# Patient Record
Sex: MALE | Race: ASIAN | NOT HISPANIC OR LATINO | URBAN - METROPOLITAN AREA
[De-identification: names, ages, dates, MRNs, and addresses within clinical notes are randomized per-mention and may not be internally consistent; named-entity substitution may affect disease eponyms.]

---

## 2018-08-19 ENCOUNTER — EMERGENCY (EMERGENCY)
Facility: HOSPITAL | Age: 43
LOS: 1 days | Discharge: HOME | End: 2018-08-19
Attending: EMERGENCY MEDICINE | Admitting: EMERGENCY MEDICINE
Payer: SELF-PAY

## 2018-08-19 VITALS
HEART RATE: 125 BPM | TEMPERATURE: 99 F | OXYGEN SATURATION: 100 % | RESPIRATION RATE: 16 BRPM | DIASTOLIC BLOOD PRESSURE: 129 MMHG | SYSTOLIC BLOOD PRESSURE: 153 MMHG

## 2018-08-19 VITALS
OXYGEN SATURATION: 100 % | DIASTOLIC BLOOD PRESSURE: 95 MMHG | TEMPERATURE: 99 F | SYSTOLIC BLOOD PRESSURE: 148 MMHG | HEART RATE: 93 BPM | RESPIRATION RATE: 16 BRPM

## 2018-08-19 LAB
ALBUMIN SERPL ELPH-MCNC: 4.4 G/DL — SIGNIFICANT CHANGE UP (ref 3.3–5)
ALP SERPL-CCNC: 89 U/L — SIGNIFICANT CHANGE UP (ref 40–120)
ALT FLD-CCNC: 30 U/L — SIGNIFICANT CHANGE UP (ref 4–41)
AST SERPL-CCNC: 35 U/L — SIGNIFICANT CHANGE UP (ref 4–40)
BASE EXCESS BLDV CALC-SCNC: 11.4 MMOL/L — SIGNIFICANT CHANGE UP
BASOPHILS # BLD AUTO: 0.01 K/UL — SIGNIFICANT CHANGE UP (ref 0–0.2)
BASOPHILS NFR BLD AUTO: 0.1 % — SIGNIFICANT CHANGE UP (ref 0–2)
BILIRUB SERPL-MCNC: 1.2 MG/DL — SIGNIFICANT CHANGE UP (ref 0.2–1.2)
BLOOD GAS VENOUS - CREATININE: 0.62 MG/DL — SIGNIFICANT CHANGE UP (ref 0.5–1.3)
BUN SERPL-MCNC: 16 MG/DL — SIGNIFICANT CHANGE UP (ref 7–23)
CALCIUM SERPL-MCNC: 9.7 MG/DL — SIGNIFICANT CHANGE UP (ref 8.4–10.5)
CHLORIDE BLDV-SCNC: 93 MMOL/L — LOW (ref 96–108)
CHLORIDE SERPL-SCNC: 86 MMOL/L — LOW (ref 98–107)
CO2 SERPL-SCNC: 29 MMOL/L — SIGNIFICANT CHANGE UP (ref 22–31)
CREAT SERPL-MCNC: 0.75 MG/DL — SIGNIFICANT CHANGE UP (ref 0.5–1.3)
EOSINOPHIL # BLD AUTO: 0.01 K/UL — SIGNIFICANT CHANGE UP (ref 0–0.5)
EOSINOPHIL NFR BLD AUTO: 0.1 % — SIGNIFICANT CHANGE UP (ref 0–6)
GAS PNL BLDV: 131 MMOL/L — LOW (ref 136–146)
GLUCOSE BLDV-MCNC: 153 — HIGH (ref 70–99)
GLUCOSE SERPL-MCNC: 151 MG/DL — HIGH (ref 70–99)
HCO3 BLDV-SCNC: 34 MMOL/L — HIGH (ref 20–27)
HCT VFR BLD CALC: 41.8 % — SIGNIFICANT CHANGE UP (ref 39–50)
HCT VFR BLDV CALC: 45.1 % — SIGNIFICANT CHANGE UP (ref 39–51)
HGB BLD-MCNC: 14.1 G/DL — SIGNIFICANT CHANGE UP (ref 13–17)
HGB BLDV-MCNC: 14.7 G/DL — SIGNIFICANT CHANGE UP (ref 13–17)
IMM GRANULOCYTES # BLD AUTO: 0.05 # — SIGNIFICANT CHANGE UP
IMM GRANULOCYTES NFR BLD AUTO: 0.7 % — SIGNIFICANT CHANGE UP (ref 0–1.5)
LACTATE BLDV-MCNC: 2.9 MMOL/L — HIGH (ref 0.5–2)
LIDOCAIN IGE QN: 35.6 U/L — SIGNIFICANT CHANGE UP (ref 7–60)
LYMPHOCYTES # BLD AUTO: 0.95 K/UL — LOW (ref 1–3.3)
LYMPHOCYTES # BLD AUTO: 12.8 % — LOW (ref 13–44)
MCHC RBC-ENTMCNC: 26.8 PG — LOW (ref 27–34)
MCHC RBC-ENTMCNC: 33.7 % — SIGNIFICANT CHANGE UP (ref 32–36)
MCV RBC AUTO: 79.3 FL — LOW (ref 80–100)
MONOCYTES # BLD AUTO: 0.41 K/UL — SIGNIFICANT CHANGE UP (ref 0–0.9)
MONOCYTES NFR BLD AUTO: 5.5 % — SIGNIFICANT CHANGE UP (ref 2–14)
NEUTROPHILS # BLD AUTO: 6.02 K/UL — SIGNIFICANT CHANGE UP (ref 1.8–7.4)
NEUTROPHILS NFR BLD AUTO: 80.8 % — HIGH (ref 43–77)
NRBC # FLD: 0 — SIGNIFICANT CHANGE UP
PCO2 BLDV: 44 MMHG — SIGNIFICANT CHANGE UP (ref 41–51)
PH BLDV: 7.52 PH — HIGH (ref 7.32–7.43)
PLATELET # BLD AUTO: 183 K/UL — SIGNIFICANT CHANGE UP (ref 150–400)
PMV BLD: 10.7 FL — SIGNIFICANT CHANGE UP (ref 7–13)
PO2 BLDV: 29 MMHG — LOW (ref 35–40)
POTASSIUM BLDV-SCNC: 3 MMOL/L — LOW (ref 3.4–4.5)
POTASSIUM SERPL-MCNC: 3.1 MMOL/L — LOW (ref 3.5–5.3)
POTASSIUM SERPL-SCNC: 3.1 MMOL/L — LOW (ref 3.5–5.3)
PROT SERPL-MCNC: 8.1 G/DL — SIGNIFICANT CHANGE UP (ref 6–8.3)
RBC # BLD: 5.27 M/UL — SIGNIFICANT CHANGE UP (ref 4.2–5.8)
RBC # FLD: 13.6 % — SIGNIFICANT CHANGE UP (ref 10.3–14.5)
SAO2 % BLDV: 54.8 % — LOW (ref 60–85)
SODIUM SERPL-SCNC: 133 MMOL/L — LOW (ref 135–145)
TROPONIN T, HIGH SENSITIVITY: 6 NG/L — SIGNIFICANT CHANGE UP (ref ?–14)
TROPONIN T, HIGH SENSITIVITY: 8 NG/L — SIGNIFICANT CHANGE UP (ref ?–14)
WBC # BLD: 7.45 K/UL — SIGNIFICANT CHANGE UP (ref 3.8–10.5)
WBC # FLD AUTO: 7.45 K/UL — SIGNIFICANT CHANGE UP (ref 3.8–10.5)

## 2018-08-19 PROCEDURE — 99285 EMERGENCY DEPT VISIT HI MDM: CPT

## 2018-08-19 PROCEDURE — 71046 X-RAY EXAM CHEST 2 VIEWS: CPT | Mod: 26

## 2018-08-19 RX ORDER — POTASSIUM CHLORIDE 20 MEQ
40 PACKET (EA) ORAL ONCE
Qty: 0 | Refills: 0 | Status: COMPLETED | OUTPATIENT
Start: 2018-08-19 | End: 2018-08-19

## 2018-08-19 RX ORDER — MORPHINE SULFATE 50 MG/1
2 CAPSULE, EXTENDED RELEASE ORAL ONCE
Qty: 0 | Refills: 0 | Status: DISCONTINUED | OUTPATIENT
Start: 2018-08-19 | End: 2018-08-19

## 2018-08-19 RX ORDER — ONDANSETRON 8 MG/1
8 TABLET, FILM COATED ORAL ONCE
Qty: 0 | Refills: 0 | Status: COMPLETED | OUTPATIENT
Start: 2018-08-19 | End: 2018-08-19

## 2018-08-19 RX ORDER — METOCLOPRAMIDE HCL 10 MG
10 TABLET ORAL ONCE
Qty: 0 | Refills: 0 | Status: COMPLETED | OUTPATIENT
Start: 2018-08-19 | End: 2018-08-19

## 2018-08-19 RX ORDER — SODIUM CHLORIDE 9 MG/ML
1000 INJECTION, SOLUTION INTRAVENOUS ONCE
Qty: 0 | Refills: 0 | Status: COMPLETED | OUTPATIENT
Start: 2018-08-19 | End: 2018-08-19

## 2018-08-19 RX ADMIN — Medication 40 MILLIEQUIVALENT(S): at 14:37

## 2018-08-19 RX ADMIN — SODIUM CHLORIDE 1000 MILLILITER(S): 9 INJECTION, SOLUTION INTRAVENOUS at 11:55

## 2018-08-19 RX ADMIN — MORPHINE SULFATE 2 MILLIGRAM(S): 50 CAPSULE, EXTENDED RELEASE ORAL at 12:06

## 2018-08-19 RX ADMIN — ONDANSETRON 8 MILLIGRAM(S): 8 TABLET, FILM COATED ORAL at 11:55

## 2018-08-19 RX ADMIN — Medication 10 MILLIGRAM(S): at 16:01

## 2018-08-19 NOTE — ED PROVIDER NOTE - NEUROLOGICAL, MLM
no fasciculations or tremer. Pt not agitated. Alert and oriented, no focal deficits, no motor or sensory deficits.

## 2018-08-19 NOTE — ED PROVIDER NOTE - ATTENDING CONTRIBUTION TO CARE
I performed a face to face bedside interview with patient regarding history of present illness, review of symptoms and past medical history. I completed an independent physical exam.  I have discussed patient's plan of care.   I agree with note as stated above, having amended the EMR as needed to reflect my findings. I have discussed the assessment and plan of care.  This includes during the time I functioned as the attending physician for this patient.  Attending Contribution to Care: agree with plan of resident. pt p/w cp a/w acute intoxication. hx of pancreatitis. pt sitting comfortably in bed. lipase neg. cxr neg. labs wnl. improved lactate.

## 2018-08-19 NOTE — ED PROVIDER NOTE - CARE PLAN
Principal Discharge DX:	Vomiting  Assessment and plan of treatment:	The patient was given verbal and written discharge instructions. Specifically, instructions when to return to the ED and when to seek follow-up from their pcp was discussed. Any specialty follow-up was discussed, including how to make an appointment.  Instructions were discussed in simple, plain language and was understood by the patient. The patient understands that should their symptoms worsen or any new symptoms arise, they should return to the ED immediately for further evaluation.

## 2018-08-19 NOTE — ED ADULT NURSE NOTE - NSIMPLEMENTINTERV_GEN_ALL_ED
Implemented All Universal Safety Interventions:  [place] to call system. Call bell, personal items and telephone within reach. Instruct patient to call for assistance. Room bathroom lighting operational. Non-slip footwear when patient is off stretcher. Physically safe environment: no spills, clutter or unnecessary equipment. Stretcher in lowest position, wheels locked, appropriate side rails in place.

## 2018-08-19 NOTE — ED PROVIDER NOTE - OBJECTIVE STATEMENT
43yM hx etoh abuse, no pmd p/w vomiting x 2d, midsternal cp and throat pain since 6pm last night. Last drink was yesterday afternoon. Pt has no hx pancreatitis. Has had "withdrawal shakes" before but never had a seizure. Denies cough, hemoptysis or hemetemesis or black or bloody stools.

## 2018-08-19 NOTE — ED ADULT NURSE NOTE - OBJECTIVE STATEMENT
Pt a&ox3 c/o n/v for the past 2 days, denies sob breathing even and unlabored, denies headache/dizziness, abd soft, non-tender, non-distended, skin is cool dry and intact, ivl placed, labs sent, will continue to monitor.

## 2018-09-22 ENCOUNTER — EMERGENCY (EMERGENCY)
Facility: HOSPITAL | Age: 43
LOS: 1 days | Discharge: HOME | End: 2018-09-22
Attending: EMERGENCY MEDICINE | Admitting: EMERGENCY MEDICINE
Payer: SELF-PAY

## 2018-09-22 VITALS
HEART RATE: 110 BPM | TEMPERATURE: 99 F | OXYGEN SATURATION: 100 % | SYSTOLIC BLOOD PRESSURE: 159 MMHG | DIASTOLIC BLOOD PRESSURE: 118 MMHG | RESPIRATION RATE: 16 BRPM

## 2018-09-22 VITALS
RESPIRATION RATE: 16 BRPM | HEART RATE: 108 BPM | OXYGEN SATURATION: 100 % | SYSTOLIC BLOOD PRESSURE: 138 MMHG | TEMPERATURE: 99 F | DIASTOLIC BLOOD PRESSURE: 88 MMHG

## 2018-09-22 LAB
ALBUMIN SERPL ELPH-MCNC: 4.6 G/DL — SIGNIFICANT CHANGE UP (ref 3.3–5)
ALP SERPL-CCNC: 106 U/L — SIGNIFICANT CHANGE UP (ref 40–120)
ALT FLD-CCNC: 37 U/L — SIGNIFICANT CHANGE UP (ref 4–41)
APAP SERPL-MCNC: < 15 UG/ML — LOW (ref 15–25)
AST SERPL-CCNC: 38 U/L — SIGNIFICANT CHANGE UP (ref 4–40)
BASE EXCESS BLDV CALC-SCNC: 3.7 MMOL/L — SIGNIFICANT CHANGE UP
BASOPHILS # BLD AUTO: 0.02 K/UL — SIGNIFICANT CHANGE UP (ref 0–0.2)
BASOPHILS NFR BLD AUTO: 0.4 % — SIGNIFICANT CHANGE UP (ref 0–2)
BILIRUB SERPL-MCNC: 0.7 MG/DL — SIGNIFICANT CHANGE UP (ref 0.2–1.2)
BLOOD GAS VENOUS - CREATININE: 0.65 MG/DL — SIGNIFICANT CHANGE UP (ref 0.5–1.3)
BUN SERPL-MCNC: 9 MG/DL — SIGNIFICANT CHANGE UP (ref 7–23)
CALCIUM SERPL-MCNC: 9.6 MG/DL — SIGNIFICANT CHANGE UP (ref 8.4–10.5)
CHLORIDE BLDV-SCNC: 100 MMOL/L — SIGNIFICANT CHANGE UP (ref 96–108)
CHLORIDE SERPL-SCNC: 90 MMOL/L — LOW (ref 98–107)
CO2 SERPL-SCNC: 22 MMOL/L — SIGNIFICANT CHANGE UP (ref 22–31)
CREAT SERPL-MCNC: 0.65 MG/DL — SIGNIFICANT CHANGE UP (ref 0.5–1.3)
EOSINOPHIL # BLD AUTO: 0 K/UL — SIGNIFICANT CHANGE UP (ref 0–0.5)
EOSINOPHIL NFR BLD AUTO: 0 % — SIGNIFICANT CHANGE UP (ref 0–6)
ETHANOL BLD-MCNC: 14 MG/DL — HIGH
GAS PNL BLDV: 130 MMOL/L — LOW (ref 136–146)
GLUCOSE BLDV-MCNC: 117 — HIGH (ref 70–99)
GLUCOSE SERPL-MCNC: 112 MG/DL — HIGH (ref 70–99)
HCO3 BLDV-SCNC: 28 MMOL/L — HIGH (ref 20–27)
HCT VFR BLD CALC: 39.5 % — SIGNIFICANT CHANGE UP (ref 39–50)
HCT VFR BLDV CALC: 42.8 % — SIGNIFICANT CHANGE UP (ref 39–51)
HGB BLD-MCNC: 13.4 G/DL — SIGNIFICANT CHANGE UP (ref 13–17)
HGB BLDV-MCNC: 13.9 G/DL — SIGNIFICANT CHANGE UP (ref 13–17)
IMM GRANULOCYTES # BLD AUTO: 0.01 # — SIGNIFICANT CHANGE UP
IMM GRANULOCYTES NFR BLD AUTO: 0.2 % — SIGNIFICANT CHANGE UP (ref 0–1.5)
LACTATE BLDV-MCNC: 1.9 MMOL/L — SIGNIFICANT CHANGE UP (ref 0.5–2)
LACTATE BLDV-MCNC: 4.2 MMOL/L — CRITICAL HIGH (ref 0.5–2)
LIDOCAIN IGE QN: 36.4 U/L — SIGNIFICANT CHANGE UP (ref 7–60)
LYMPHOCYTES # BLD AUTO: 1.38 K/UL — SIGNIFICANT CHANGE UP (ref 1–3.3)
LYMPHOCYTES # BLD AUTO: 24.6 % — SIGNIFICANT CHANGE UP (ref 13–44)
MCHC RBC-ENTMCNC: 26.9 PG — LOW (ref 27–34)
MCHC RBC-ENTMCNC: 33.9 % — SIGNIFICANT CHANGE UP (ref 32–36)
MCV RBC AUTO: 79.3 FL — LOW (ref 80–100)
MONOCYTES # BLD AUTO: 0.21 K/UL — SIGNIFICANT CHANGE UP (ref 0–0.9)
MONOCYTES NFR BLD AUTO: 3.7 % — SIGNIFICANT CHANGE UP (ref 2–14)
NEUTROPHILS # BLD AUTO: 4 K/UL — SIGNIFICANT CHANGE UP (ref 1.8–7.4)
NEUTROPHILS NFR BLD AUTO: 71.1 % — SIGNIFICANT CHANGE UP (ref 43–77)
NRBC # FLD: 0 — SIGNIFICANT CHANGE UP
PCO2 BLDV: 37 MMHG — LOW (ref 41–51)
PH BLDV: 7.48 PH — HIGH (ref 7.32–7.43)
PLATELET # BLD AUTO: 178 K/UL — SIGNIFICANT CHANGE UP (ref 150–400)
PMV BLD: 10 FL — SIGNIFICANT CHANGE UP (ref 7–13)
PO2 BLDV: 51 MMHG — HIGH (ref 35–40)
POTASSIUM BLDV-SCNC: 3.7 MMOL/L — SIGNIFICANT CHANGE UP (ref 3.4–4.5)
POTASSIUM SERPL-MCNC: 4 MMOL/L — SIGNIFICANT CHANGE UP (ref 3.5–5.3)
POTASSIUM SERPL-SCNC: 4 MMOL/L — SIGNIFICANT CHANGE UP (ref 3.5–5.3)
PROT SERPL-MCNC: 8 G/DL — SIGNIFICANT CHANGE UP (ref 6–8.3)
RBC # BLD: 4.98 M/UL — SIGNIFICANT CHANGE UP (ref 4.2–5.8)
RBC # FLD: 14.3 % — SIGNIFICANT CHANGE UP (ref 10.3–14.5)
SALICYLATES SERPL-MCNC: < 5 MG/DL — LOW (ref 15–30)
SAO2 % BLDV: 84.7 % — SIGNIFICANT CHANGE UP (ref 60–85)
SODIUM SERPL-SCNC: 135 MMOL/L — SIGNIFICANT CHANGE UP (ref 135–145)
WBC # BLD: 5.62 K/UL — SIGNIFICANT CHANGE UP (ref 3.8–10.5)
WBC # FLD AUTO: 5.62 K/UL — SIGNIFICANT CHANGE UP (ref 3.8–10.5)

## 2018-09-22 PROCEDURE — 70450 CT HEAD/BRAIN W/O DYE: CPT | Mod: 26

## 2018-09-22 PROCEDURE — 74177 CT ABD & PELVIS W/CONTRAST: CPT | Mod: 26

## 2018-09-22 PROCEDURE — 76705 ECHO EXAM OF ABDOMEN: CPT | Mod: 26

## 2018-09-22 PROCEDURE — 99285 EMERGENCY DEPT VISIT HI MDM: CPT

## 2018-09-22 RX ORDER — FAMOTIDINE 10 MG/ML
20 INJECTION INTRAVENOUS ONCE
Qty: 0 | Refills: 0 | Status: COMPLETED | OUTPATIENT
Start: 2018-09-22 | End: 2018-09-22

## 2018-09-22 RX ORDER — THIAMINE MONONITRATE (VIT B1) 100 MG
100 TABLET ORAL ONCE
Qty: 0 | Refills: 0 | Status: COMPLETED | OUTPATIENT
Start: 2018-09-22 | End: 2018-09-22

## 2018-09-22 RX ORDER — ACETAMINOPHEN 500 MG
650 TABLET ORAL ONCE
Qty: 0 | Refills: 0 | Status: COMPLETED | OUTPATIENT
Start: 2018-09-22 | End: 2018-09-22

## 2018-09-22 RX ORDER — SODIUM CHLORIDE 9 MG/ML
1000 INJECTION INTRAMUSCULAR; INTRAVENOUS; SUBCUTANEOUS ONCE
Qty: 0 | Refills: 0 | Status: COMPLETED | OUTPATIENT
Start: 2018-09-22 | End: 2018-09-22

## 2018-09-22 RX ORDER — ONDANSETRON 8 MG/1
4 TABLET, FILM COATED ORAL ONCE
Qty: 0 | Refills: 0 | Status: COMPLETED | OUTPATIENT
Start: 2018-09-22 | End: 2018-09-22

## 2018-09-22 RX ORDER — DIAZEPAM 5 MG
10 TABLET ORAL ONCE
Qty: 0 | Refills: 0 | Status: DISCONTINUED | OUTPATIENT
Start: 2018-09-22 | End: 2018-09-22

## 2018-09-22 RX ADMIN — SODIUM CHLORIDE 1000 MILLILITER(S): 9 INJECTION INTRAMUSCULAR; INTRAVENOUS; SUBCUTANEOUS at 14:59

## 2018-09-22 RX ADMIN — ONDANSETRON 4 MILLIGRAM(S): 8 TABLET, FILM COATED ORAL at 15:54

## 2018-09-22 RX ADMIN — Medication 2 MILLIGRAM(S): at 15:55

## 2018-09-22 RX ADMIN — Medication 650 MILLIGRAM(S): at 21:56

## 2018-09-22 RX ADMIN — Medication 30 MILLILITER(S): at 14:58

## 2018-09-22 RX ADMIN — Medication 50 MILLIGRAM(S): at 14:58

## 2018-09-22 RX ADMIN — FAMOTIDINE 20 MILLIGRAM(S): 10 INJECTION INTRAVENOUS at 14:45

## 2018-09-22 RX ADMIN — Medication 100 MILLIGRAM(S): at 15:54

## 2018-09-22 RX ADMIN — ONDANSETRON 4 MILLIGRAM(S): 8 TABLET, FILM COATED ORAL at 14:45

## 2018-09-22 RX ADMIN — Medication 1 TABLET(S): at 14:59

## 2018-09-22 RX ADMIN — Medication 100 MILLIGRAM(S): at 14:59

## 2018-09-22 NOTE — ED PROVIDER NOTE - OBJECTIVE STATEMENT
43 y.o. male with No PMHx here for vomiting. The patient states he was drinking last night (vodka, up to 6 shots) when he began to have multiple episodes of vomiting last night and this AM as well. Has been unable to tolerate PO intake. The patient also states 4 days ago while drinking he fell in the bathroom on to his left side hitting his head and back. The patient denies LOC, states he tripped in the bathroom. The patient does endorse headache since the fall. Has been having pain after vomiting, epigastric in location.

## 2018-09-22 NOTE — ED PROVIDER NOTE - MEDICAL DECISION MAKING DETAILS
43 y.o. male with EtOH abuse here for nausea/vomiting after EtOH use, most likely gastritis. Concern for ICH given recent fall and elevated lactate, will get CT head.

## 2018-09-22 NOTE — ED ADULT NURSE NOTE - NSIMPLEMENTINTERV_GEN_ALL_ED
Implemented All Universal Safety Interventions:  [place] to call system. Call bell, personal items and telephone within reach. Instruct patient to call for assistance. Room bathroom lighting operational. Non-slip footwear when patient is off stretcher. Physically safe environment: no spills, clutter or unnecessary equipment. Stretcher in lowest position, wheels locked, appropriate side rails in place.

## 2018-09-22 NOTE — ED PROVIDER NOTE - PROGRESS NOTE DETAILS
pt sitting up  on the stretcher, more comfortable. Not tremulous. Looks more comfortable. pending ct read. needs repeat lactic level. will endorse to Dr Cornejo. Patient reassessed, reports improvement and ready to be discharged

## 2018-09-22 NOTE — ED ADULT TRIAGE NOTE - CHIEF COMPLAINT QUOTE
Pt w multiple c/o, c/o vomiting x last night after drinking 6 shots of vodka, states he drinks alcohol every day, last drink last night, c/o fall 4 days ago, denies LOC/blood thinner use, healed lac noted to forehead.

## 2018-09-22 NOTE — ED ADULT NURSE NOTE - OBJECTIVE STATEMENT
patient alert ox3 came in c/o vomiting about 20 times clear liquids with head ache. h/o fall 4 days ago and fell in the bathroom and hit tiles. did not seek medical attention after the fall. patient is been drinking daily for the past 15 days about 5-6 shots daily. last drink was last night. c/o abdominal pain and throat pain.

## 2018-11-16 ENCOUNTER — EMERGENCY (EMERGENCY)
Facility: HOSPITAL | Age: 43
LOS: 1 days | Discharge: HOME | End: 2018-11-16
Attending: EMERGENCY MEDICINE | Admitting: EMERGENCY MEDICINE
Payer: SELF-PAY

## 2018-11-16 VITALS
RESPIRATION RATE: 19 BRPM | DIASTOLIC BLOOD PRESSURE: 84 MMHG | OXYGEN SATURATION: 98 % | SYSTOLIC BLOOD PRESSURE: 146 MMHG | HEART RATE: 98 BPM

## 2018-11-16 VITALS
TEMPERATURE: 98 F | SYSTOLIC BLOOD PRESSURE: 154 MMHG | DIASTOLIC BLOOD PRESSURE: 110 MMHG | RESPIRATION RATE: 18 BRPM | OXYGEN SATURATION: 95 % | HEART RATE: 130 BPM

## 2018-11-16 LAB
ALBUMIN SERPL ELPH-MCNC: 4.8 G/DL — SIGNIFICANT CHANGE UP (ref 3.3–5)
ALP SERPL-CCNC: 90 U/L — SIGNIFICANT CHANGE UP (ref 40–120)
ALT FLD-CCNC: 57 U/L — HIGH (ref 4–41)
AST SERPL-CCNC: 69 U/L — HIGH (ref 4–40)
BASOPHILS # BLD AUTO: 0.03 K/UL — SIGNIFICANT CHANGE UP (ref 0–0.2)
BASOPHILS NFR BLD AUTO: 0.4 % — SIGNIFICANT CHANGE UP (ref 0–2)
BILIRUB SERPL-MCNC: 0.9 MG/DL — SIGNIFICANT CHANGE UP (ref 0.2–1.2)
BUN SERPL-MCNC: 18 MG/DL — SIGNIFICANT CHANGE UP (ref 7–23)
CALCIUM SERPL-MCNC: 9.5 MG/DL — SIGNIFICANT CHANGE UP (ref 8.4–10.5)
CHLORIDE SERPL-SCNC: 87 MMOL/L — LOW (ref 98–107)
CO2 SERPL-SCNC: 20 MMOL/L — LOW (ref 22–31)
CREAT SERPL-MCNC: 0.74 MG/DL — SIGNIFICANT CHANGE UP (ref 0.5–1.3)
EOSINOPHIL # BLD AUTO: 0.03 K/UL — SIGNIFICANT CHANGE UP (ref 0–0.5)
EOSINOPHIL NFR BLD AUTO: 0.4 % — SIGNIFICANT CHANGE UP (ref 0–6)
GLUCOSE SERPL-MCNC: 129 MG/DL — HIGH (ref 70–99)
HCT VFR BLD CALC: 42.4 % — SIGNIFICANT CHANGE UP (ref 39–50)
HGB BLD-MCNC: 14.3 G/DL — SIGNIFICANT CHANGE UP (ref 13–17)
IMM GRANULOCYTES # BLD AUTO: 0.02 # — SIGNIFICANT CHANGE UP
IMM GRANULOCYTES NFR BLD AUTO: 0.3 % — SIGNIFICANT CHANGE UP (ref 0–1.5)
LIDOCAIN IGE QN: 63.3 U/L — HIGH (ref 7–60)
LYMPHOCYTES # BLD AUTO: 1.57 K/UL — SIGNIFICANT CHANGE UP (ref 1–3.3)
LYMPHOCYTES # BLD AUTO: 22.7 % — SIGNIFICANT CHANGE UP (ref 13–44)
MAGNESIUM SERPL-MCNC: 2.2 MG/DL — SIGNIFICANT CHANGE UP (ref 1.6–2.6)
MCHC RBC-ENTMCNC: 27.2 PG — SIGNIFICANT CHANGE UP (ref 27–34)
MCHC RBC-ENTMCNC: 33.7 % — SIGNIFICANT CHANGE UP (ref 32–36)
MCV RBC AUTO: 80.8 FL — SIGNIFICANT CHANGE UP (ref 80–100)
MONOCYTES # BLD AUTO: 0.27 K/UL — SIGNIFICANT CHANGE UP (ref 0–0.9)
MONOCYTES NFR BLD AUTO: 3.9 % — SIGNIFICANT CHANGE UP (ref 2–14)
NEUTROPHILS # BLD AUTO: 5 K/UL — SIGNIFICANT CHANGE UP (ref 1.8–7.4)
NEUTROPHILS NFR BLD AUTO: 72.3 % — SIGNIFICANT CHANGE UP (ref 43–77)
NRBC # FLD: 0 — SIGNIFICANT CHANGE UP
PLATELET # BLD AUTO: 158 K/UL — SIGNIFICANT CHANGE UP (ref 150–400)
PMV BLD: 10.6 FL — SIGNIFICANT CHANGE UP (ref 7–13)
POTASSIUM SERPL-MCNC: 3.6 MMOL/L — SIGNIFICANT CHANGE UP (ref 3.5–5.3)
POTASSIUM SERPL-SCNC: 3.6 MMOL/L — SIGNIFICANT CHANGE UP (ref 3.5–5.3)
PROT SERPL-MCNC: 8.5 G/DL — HIGH (ref 6–8.3)
RBC # BLD: 5.25 M/UL — SIGNIFICANT CHANGE UP (ref 4.2–5.8)
RBC # FLD: 14.3 % — SIGNIFICANT CHANGE UP (ref 10.3–14.5)
SODIUM SERPL-SCNC: 131 MMOL/L — LOW (ref 135–145)
WBC # BLD: 6.92 K/UL — SIGNIFICANT CHANGE UP (ref 3.8–10.5)
WBC # FLD AUTO: 6.92 K/UL — SIGNIFICANT CHANGE UP (ref 3.8–10.5)

## 2018-11-16 PROCEDURE — 99053 MED SERV 10PM-8AM 24 HR FAC: CPT

## 2018-11-16 PROCEDURE — 93010 ELECTROCARDIOGRAM REPORT: CPT

## 2018-11-16 PROCEDURE — 99284 EMERGENCY DEPT VISIT MOD MDM: CPT | Mod: 25

## 2018-11-16 RX ORDER — ONDANSETRON 8 MG/1
1 TABLET, FILM COATED ORAL
Qty: 8 | Refills: 0 | OUTPATIENT
Start: 2018-11-16

## 2018-11-16 RX ORDER — FAMOTIDINE 10 MG/ML
1 INJECTION INTRAVENOUS
Qty: 10 | Refills: 0 | OUTPATIENT
Start: 2018-11-16

## 2018-11-16 RX ORDER — SODIUM CHLORIDE 9 MG/ML
1000 INJECTION INTRAMUSCULAR; INTRAVENOUS; SUBCUTANEOUS ONCE
Qty: 0 | Refills: 0 | Status: COMPLETED | OUTPATIENT
Start: 2018-11-16 | End: 2018-11-16

## 2018-11-16 RX ORDER — ONDANSETRON 8 MG/1
1 TABLET, FILM COATED ORAL
Qty: 8 | Refills: 0
Start: 2018-11-16

## 2018-11-16 RX ORDER — METOCLOPRAMIDE HCL 10 MG
10 TABLET ORAL ONCE
Qty: 0 | Refills: 0 | Status: COMPLETED | OUTPATIENT
Start: 2018-11-16 | End: 2018-11-16

## 2018-11-16 RX ORDER — ONDANSETRON 8 MG/1
4 TABLET, FILM COATED ORAL ONCE
Qty: 0 | Refills: 0 | Status: COMPLETED | OUTPATIENT
Start: 2018-11-16 | End: 2018-11-16

## 2018-11-16 RX ORDER — THIAMINE MONONITRATE (VIT B1) 100 MG
100 TABLET ORAL ONCE
Qty: 0 | Refills: 0 | Status: COMPLETED | OUTPATIENT
Start: 2018-11-16 | End: 2018-11-16

## 2018-11-16 RX ORDER — FAMOTIDINE 10 MG/ML
1 INJECTION INTRAVENOUS
Qty: 10 | Refills: 0
Start: 2018-11-16

## 2018-11-16 RX ORDER — FAMOTIDINE 10 MG/ML
20 INJECTION INTRAVENOUS ONCE
Qty: 0 | Refills: 0 | Status: COMPLETED | OUTPATIENT
Start: 2018-11-16 | End: 2018-11-16

## 2018-11-16 RX ADMIN — SODIUM CHLORIDE 1000 MILLILITER(S): 9 INJECTION INTRAMUSCULAR; INTRAVENOUS; SUBCUTANEOUS at 08:45

## 2018-11-16 RX ADMIN — Medication 10 MILLIGRAM(S): at 11:38

## 2018-11-16 RX ADMIN — FAMOTIDINE 20 MILLIGRAM(S): 10 INJECTION INTRAVENOUS at 08:45

## 2018-11-16 RX ADMIN — ONDANSETRON 4 MILLIGRAM(S): 8 TABLET, FILM COATED ORAL at 08:45

## 2018-11-16 RX ADMIN — SODIUM CHLORIDE 1000 MILLILITER(S): 9 INJECTION INTRAMUSCULAR; INTRAVENOUS; SUBCUTANEOUS at 12:39

## 2018-11-16 RX ADMIN — Medication 30 MILLILITER(S): at 13:38

## 2018-11-16 RX ADMIN — SODIUM CHLORIDE 1000 MILLILITER(S): 9 INJECTION INTRAMUSCULAR; INTRAVENOUS; SUBCUTANEOUS at 11:38

## 2018-11-16 RX ADMIN — SODIUM CHLORIDE 1000 MILLILITER(S): 9 INJECTION INTRAMUSCULAR; INTRAVENOUS; SUBCUTANEOUS at 11:35

## 2018-11-16 RX ADMIN — Medication 100 MILLIGRAM(S): at 11:38

## 2018-11-16 NOTE — ED PROVIDER NOTE - PROGRESS NOTE DETAILS
Dr. Urias: Pt states feeling better. Tolerating PO. Ambulating without any difficulty. Sent Pepcid and Zofran to pharmacy.

## 2018-11-16 NOTE — ED PROVIDER NOTE - CARE PLAN
Principal Discharge DX:	Non-intractable vomiting with nausea, unspecified vomiting type Principal Discharge DX:	Alcohol-induced acute pancreatitis, unspecified complication status

## 2018-11-16 NOTE — SBIRT NOTE. - NSSBIRTFULLSCREEN_GEN_A_ED_FT
Meeting with patient attempted however Full Screen Not Performed due to  Patient unwilling to complete

## 2018-11-16 NOTE — ED PROVIDER NOTE - PRINCIPAL DIAGNOSIS
Non-intractable vomiting with nausea, unspecified vomiting type Alcohol-induced acute pancreatitis, unspecified complication status

## 2018-11-16 NOTE — ED ADULT TRIAGE NOTE - CHIEF COMPLAINT QUOTE
pt. c/o nausea and several episodes of emesis after drinking 3 shots of [place] Walker last night.  Pt. admits to drinking on a daily basis.  No PMHx.

## 2018-11-16 NOTE — ED PROVIDER NOTE - MEDICAL DECISION MAKING DETAILS
42 y/o male with PMHx of EtOH abuse presents to ED for nausea and vomiting today. Concern for EtOH induced nausea/vomiting, gastritis, pancreatitis. Noted to have tachycardia. Will obtain labs, CBC, CMP, lipase and given IVF and antiemetics

## 2018-11-16 NOTE — ED ADULT NURSE NOTE - NSIMPLEMENTINTERV_GEN_ALL_ED
Implemented All Universal Safety Interventions:  [place] to call system. Call bell, personal items and telephone within reach. Instruct patient to call for assistance. Room bathroom lighting operational. Non-slip footwear when patient is off stretcher. Physically safe environment: no spills, clutter or unnecessary equipment. Stretcher in lowest position, wheels locked, appropriate side rails in place.

## 2018-11-16 NOTE — ED ADULT NURSE NOTE - OBJECTIVE STATEMENT
Patient received to room 3, A&Ox3, respirations even and unlabored, skin warm and letha good for color, ambulatory at baseline. patient reports nausea and vomiting "too much" since last night. Denies chest pain, SOB, abdominal pain. Right AC 20g IV placed, labs drawn and sent. Denies past medical history or any medications use at home.

## 2018-11-16 NOTE — ED PROVIDER NOTE - ATTENDING CONTRIBUTION TO CARE
Pt was seen and evaluated by me. Pt is a 44 y/o male with PMHx of EtOH abuse presents to ED for nausea and vomiting today. Pt states he last drank yesterday and last night started to have nausea with vomiting. Pt denies any fever, chills, SOB, chest pain, abd pain, or diarrhea. Denies any blood in vomitus. Denies any previous withdrawal from alcohol. Lungs CTA b/l. Tachy. Abd soft, non-tender.

## 2018-11-16 NOTE — ED PROVIDER NOTE - OBJECTIVE STATEMENT
44 y/o male with PMHx of EtOH abuse presents to ED for nausea and vomiting today. Pt states he last drank yesterday and last night started to have nausea with vomiting. Pt denies any fever, chills, SOB, chest pain, abd pain, or diarrhea. Denies any blood in vomitus. Denies any previous withdrawal from alcohol.

## 2018-11-16 NOTE — ED PROVIDER NOTE - NSFOLLOWUPINSTRUCTIONS_ED_ALL_ED_FT
Avoid excessive alcohol intake.  [place] diet for next 24 hours.  May take Pepcid 20mg daily.  May take Zofran 4mg every 6 hours as needed for nausea/vomiting.

## 2019-05-11 NOTE — ED PROVIDER NOTE - NSALCOHOLLASTUSE_GEN_A_CORE_DT
CONST: Well appearing in NAD  ENT: #12 ttp, poor dentition throughout  CARD: Normal S1 S2; Normal rate and rhythm  RESP: Equal BS B/L, No wheezes, rhonchi or rales. No distress  SKIN: Warm, dry, no acute rashes. Good turgor  NEURO: A&Ox3, No focal deficits. Strength 5/5 with no sensory deficits. Steady gait
21-Sep-2018

## 2019-09-19 ENCOUNTER — EMERGENCY (EMERGENCY)
Facility: HOSPITAL | Age: 44
LOS: 1 days | Discharge: LEFT BEFORE DISCHARGE | End: 2019-09-19
Attending: INTERNAL MEDICINE | Admitting: HOSPITALIST
Payer: SELF-PAY

## 2019-09-19 VITALS
DIASTOLIC BLOOD PRESSURE: 122 MMHG | OXYGEN SATURATION: 98 % | HEART RATE: 130 BPM | SYSTOLIC BLOOD PRESSURE: 173 MMHG | TEMPERATURE: 98 F | RESPIRATION RATE: 20 BRPM

## 2019-09-19 LAB
ALBUMIN SERPL ELPH-MCNC: 5.2 G/DL — HIGH (ref 3.3–5)
ALP SERPL-CCNC: 103 U/L — SIGNIFICANT CHANGE UP (ref 40–120)
ALT FLD-CCNC: 102 U/L — HIGH (ref 4–41)
ANION GAP SERPL CALC-SCNC: 19 MMO/L — HIGH (ref 7–14)
AST SERPL-CCNC: 98 U/L — HIGH (ref 4–40)
BASE EXCESS BLDV CALC-SCNC: 6.8 MMOL/L — SIGNIFICANT CHANGE UP
BASOPHILS # BLD AUTO: 0.01 K/UL — SIGNIFICANT CHANGE UP (ref 0–0.2)
BASOPHILS NFR BLD AUTO: 0.2 % — SIGNIFICANT CHANGE UP (ref 0–2)
BILIRUB SERPL-MCNC: 0.9 MG/DL — SIGNIFICANT CHANGE UP (ref 0.2–1.2)
BLOOD GAS VENOUS - CREATININE: 0.88 MG/DL — SIGNIFICANT CHANGE UP (ref 0.5–1.3)
BLOOD GAS VENOUS - FIO2: 21 — SIGNIFICANT CHANGE UP
BUN SERPL-MCNC: 19 MG/DL — SIGNIFICANT CHANGE UP (ref 7–23)
CALCIUM SERPL-MCNC: 10.1 MG/DL — SIGNIFICANT CHANGE UP (ref 8.4–10.5)
CHLORIDE BLDV-SCNC: 92 MMOL/L — LOW (ref 96–108)
CHLORIDE SERPL-SCNC: 89 MMOL/L — LOW (ref 98–107)
CO2 SERPL-SCNC: 29 MMOL/L — SIGNIFICANT CHANGE UP (ref 22–31)
CREAT SERPL-MCNC: 0.7 MG/DL — SIGNIFICANT CHANGE UP (ref 0.5–1.3)
EOSINOPHIL # BLD AUTO: 0.16 K/UL — SIGNIFICANT CHANGE UP (ref 0–0.5)
EOSINOPHIL NFR BLD AUTO: 2.8 % — SIGNIFICANT CHANGE UP (ref 0–6)
GAS PNL BLDV: 138 MMOL/L — SIGNIFICANT CHANGE UP (ref 136–146)
GLUCOSE BLDV-MCNC: 200 MG/DL — HIGH (ref 70–99)
GLUCOSE SERPL-MCNC: 190 MG/DL — HIGH (ref 70–99)
HCO3 BLDV-SCNC: 30 MMOL/L — HIGH (ref 20–27)
HCT VFR BLD CALC: 39.7 % — SIGNIFICANT CHANGE UP (ref 39–50)
HCT VFR BLDV CALC: 43.9 % — SIGNIFICANT CHANGE UP (ref 39–51)
HGB BLD-MCNC: 13.4 G/DL — SIGNIFICANT CHANGE UP (ref 13–17)
HGB BLDV-MCNC: 14.3 G/DL — SIGNIFICANT CHANGE UP (ref 13–17)
IMM GRANULOCYTES NFR BLD AUTO: 0.3 % — SIGNIFICANT CHANGE UP (ref 0–1.5)
LACTATE BLDV-MCNC: 4.5 MMOL/L — CRITICAL HIGH (ref 0.5–2)
LIDOCAIN IGE QN: 48.3 U/L — SIGNIFICANT CHANGE UP (ref 7–60)
LYMPHOCYTES # BLD AUTO: 0.34 K/UL — LOW (ref 1–3.3)
LYMPHOCYTES # BLD AUTO: 5.9 % — LOW (ref 13–44)
MCHC RBC-ENTMCNC: 28.2 PG — SIGNIFICANT CHANGE UP (ref 27–34)
MCHC RBC-ENTMCNC: 33.8 % — SIGNIFICANT CHANGE UP (ref 32–36)
MCV RBC AUTO: 83.4 FL — SIGNIFICANT CHANGE UP (ref 80–100)
MONOCYTES # BLD AUTO: 0.18 K/UL — SIGNIFICANT CHANGE UP (ref 0–0.9)
MONOCYTES NFR BLD AUTO: 3.1 % — SIGNIFICANT CHANGE UP (ref 2–14)
NEUTROPHILS # BLD AUTO: 5.03 K/UL — SIGNIFICANT CHANGE UP (ref 1.8–7.4)
NEUTROPHILS NFR BLD AUTO: 87.7 % — HIGH (ref 43–77)
NRBC # FLD: 0 K/UL — SIGNIFICANT CHANGE UP (ref 0–0)
PCO2 BLDV: 40 MMHG — LOW (ref 41–51)
PH BLDV: 7.5 PH — HIGH (ref 7.32–7.43)
PLATELET # BLD AUTO: 120 K/UL — LOW (ref 150–400)
PMV BLD: 10.9 FL — SIGNIFICANT CHANGE UP (ref 7–13)
PO2 BLDV: 52 MMHG — HIGH (ref 35–40)
POTASSIUM BLDV-SCNC: 3.9 MMOL/L — SIGNIFICANT CHANGE UP (ref 3.4–4.5)
POTASSIUM SERPL-MCNC: 3.9 MMOL/L — SIGNIFICANT CHANGE UP (ref 3.5–5.3)
POTASSIUM SERPL-SCNC: 3.9 MMOL/L — SIGNIFICANT CHANGE UP (ref 3.5–5.3)
PROT SERPL-MCNC: 9.4 G/DL — HIGH (ref 6–8.3)
RBC # BLD: 4.76 M/UL — SIGNIFICANT CHANGE UP (ref 4.2–5.8)
RBC # FLD: 15.3 % — HIGH (ref 10.3–14.5)
SAO2 % BLDV: 84.2 % — SIGNIFICANT CHANGE UP (ref 60–85)
SODIUM SERPL-SCNC: 137 MMOL/L — SIGNIFICANT CHANGE UP (ref 135–145)
WBC # BLD: 5.74 K/UL — SIGNIFICANT CHANGE UP (ref 3.8–10.5)
WBC # FLD AUTO: 5.74 K/UL — SIGNIFICANT CHANGE UP (ref 3.8–10.5)

## 2019-09-19 PROCEDURE — 99284 EMERGENCY DEPT VISIT MOD MDM: CPT

## 2019-09-19 PROCEDURE — 99053 MED SERV 10PM-8AM 24 HR FAC: CPT

## 2019-09-19 RX ORDER — FOLIC ACID 0.8 MG
1 TABLET ORAL ONCE
Refills: 0 | Status: COMPLETED | OUTPATIENT
Start: 2019-09-19 | End: 2019-09-19

## 2019-09-19 RX ORDER — SODIUM CHLORIDE 9 MG/ML
1000 INJECTION INTRAMUSCULAR; INTRAVENOUS; SUBCUTANEOUS ONCE
Refills: 0 | Status: COMPLETED | OUTPATIENT
Start: 2019-09-19 | End: 2019-09-19

## 2019-09-19 RX ORDER — THIAMINE MONONITRATE (VIT B1) 100 MG
100 TABLET ORAL ONCE
Refills: 0 | Status: COMPLETED | OUTPATIENT
Start: 2019-09-19 | End: 2019-09-19

## 2019-09-19 RX ORDER — ONDANSETRON 8 MG/1
4 TABLET, FILM COATED ORAL ONCE
Refills: 0 | Status: COMPLETED | OUTPATIENT
Start: 2019-09-19 | End: 2019-09-19

## 2019-09-19 RX ADMIN — ONDANSETRON 4 MILLIGRAM(S): 8 TABLET, FILM COATED ORAL at 23:10

## 2019-09-19 RX ADMIN — Medication 1 MILLIGRAM(S): at 23:10

## 2019-09-19 RX ADMIN — SODIUM CHLORIDE 1000 MILLILITER(S): 9 INJECTION INTRAMUSCULAR; INTRAVENOUS; SUBCUTANEOUS at 23:11

## 2019-09-19 RX ADMIN — Medication 100 MILLIGRAM(S): at 23:11

## 2019-09-19 NOTE — ED PROVIDER NOTE - ATTENDING CONTRIBUTION TO CARE
DAFNE Comer MD performed a history and physical exam of the patient and discussed their management with the resident and /or advanced care provider. I reviewed the resident and /or ACP's note and agree with the documented findings and plan of care. My medical decision making and observations are found above.    Awake, Alert, Conversant.  Resting comfortably.  Dry mucous membranes.  Breath sounds clear in all lung fields.  Tachcyardic and regular heart rate without murmurs, rubs, or gallops.  Normal S1/S2.  Abdomen soft and nontender.  No lower extremity swelling or tenderness.  Tongue fasciculations and tremor not noted following librium.  Oriented and conversant with fluent speech, moving all extremities with good strength. DAFNE Comer MD performed a history and physical exam of the patient and discussed their management with the resident and /or advanced care provider. I reviewed the resident and /or ACP's note and agree with the documented findings and plan of care. My medical decision making and observations are found above.    Awake, Alert, Conversant.  Resting comfortably.  Dry mucous membranes.  Breath sounds clear in all lung fields.  Tachycardic and regular heart rate without murmurs, rubs, or gallops.  Normal S1/S2.  Abdomen soft and nontender.  No lower extremity swelling or tenderness.  Tongue fasciculations and tremor not noted following librium.  Oriented and conversant with fluent speech, moving all extremities with good strength.

## 2019-09-19 NOTE — ED PROVIDER NOTE - OBJECTIVE STATEMENT
45 yo M c PMH of alcohol abuse p/w 1 day h/o of NBNB n/v. drinks 3 shots of vodka q day for past month, last drink yesterday morning. denies abdominal pain, denies HA, fall, or head trauma. denies h/o alcohol withdrawal or seizures.

## 2019-09-19 NOTE — ED ADULT NURSE NOTE - OBJECTIVE STATEMENT
pt received alert and oriented x3. pt is a daily etoh consumer (last drink yesterday ). pt states that he been vomiting for 3 days.abd soft and nontender. respirations equal and unlabored. 20g placed in right a/c. labs drawn and sent. pt declines chest pain, sob, dizziness. sinus tachy on cm. Call bell in reach, warm blanket provided, bed in lowest position, side rails up x2,safety maintained. will continue to monitor.

## 2019-09-19 NOTE — ED ADULT TRIAGE NOTE - CHIEF COMPLAINT QUOTE
pt c/o vomitting x 3 days. denies abdominal pain, fevers or chest pain. admits to daily etoh about 3-4 shots. last drink yesterday morning

## 2019-09-19 NOTE — ED PROVIDER NOTE - CLINICAL SUMMARY MEDICAL DECISION MAKING FREE TEXT BOX
50 yo F c PMH of hypothyroidism, HTN, HLD p/w 2 day h/o gradual onset worsening L sided sharp HA and neck pain with associated lightheadedness and palpitations and L eye blurry vision, L ear pain and tinnitus. On exam, , /79 VS otherwise wnl, +tongue fasciculations, hand tremors concerning for w/d. will obtain labs. will give ivf/zofran/ativan for tx. will reassess 43 yo M c PMH of alcohol abuse p/w 1 day h/o of NBNB n/v. On exam, , /79 VS otherwise wnl, +tongue fasciculations, hand tremors concerning for w/d. will obtain labs. will give ivf/zofran/ativan for tx. will reassess    Dr. Comer: I agree with the above provided history and exam and addend/modify it as follows.  44 M hx ETOH abuse and hx withdraw P/W nausea and NBNB emesis after a recent bout of drinking, tachycardic on arrival likely due to dehydration: possible component of early withdraw.  No evidence of infectious etiology.  Doubt surgical abdominal pathology given benign exam and lack of complaint of abdominal pain.

## 2019-09-20 VITALS
HEART RATE: 97 BPM | RESPIRATION RATE: 18 BRPM | SYSTOLIC BLOOD PRESSURE: 133 MMHG | OXYGEN SATURATION: 99 % | TEMPERATURE: 98 F | DIASTOLIC BLOOD PRESSURE: 97 MMHG

## 2019-09-20 DIAGNOSIS — F10.239 ALCOHOL DEPENDENCE WITH WITHDRAWAL, UNSPECIFIED: ICD-10-CM

## 2019-09-20 DIAGNOSIS — Z29.9 ENCOUNTER FOR PROPHYLACTIC MEASURES, UNSPECIFIED: ICD-10-CM

## 2019-09-20 DIAGNOSIS — R74.0 NONSPECIFIC ELEVATION OF LEVELS OF TRANSAMINASE AND LACTIC ACID DEHYDROGENASE [LDH]: ICD-10-CM

## 2019-09-20 DIAGNOSIS — R11.2 NAUSEA WITH VOMITING, UNSPECIFIED: ICD-10-CM

## 2019-09-20 DIAGNOSIS — E87.3 ALKALOSIS: ICD-10-CM

## 2019-09-20 DIAGNOSIS — D69.6 THROMBOCYTOPENIA, UNSPECIFIED: ICD-10-CM

## 2019-09-20 DIAGNOSIS — F10.10 ALCOHOL ABUSE, UNCOMPLICATED: ICD-10-CM

## 2019-09-20 LAB
ALBUMIN SERPL ELPH-MCNC: 4.3 G/DL — SIGNIFICANT CHANGE UP (ref 3.3–5)
ALP SERPL-CCNC: 85 U/L — SIGNIFICANT CHANGE UP (ref 40–120)
ALT FLD-CCNC: 77 U/L — HIGH (ref 4–41)
ANION GAP SERPL CALC-SCNC: 11 MMO/L — SIGNIFICANT CHANGE UP (ref 7–14)
AST SERPL-CCNC: 69 U/L — HIGH (ref 4–40)
BASE EXCESS BLDV CALC-SCNC: 5.5 MMOL/L — SIGNIFICANT CHANGE UP
BASOPHILS NFR SPEC: 0 % — SIGNIFICANT CHANGE UP (ref 0–2)
BILIRUB DIRECT SERPL-MCNC: 0.2 MG/DL — SIGNIFICANT CHANGE UP (ref 0.1–0.2)
BILIRUB SERPL-MCNC: 0.8 MG/DL — SIGNIFICANT CHANGE UP (ref 0.2–1.2)
BLASTS # FLD: 0 % — SIGNIFICANT CHANGE UP (ref 0–0)
BLOOD GAS VENOUS - CREATININE: 0.7 MG/DL — SIGNIFICANT CHANGE UP (ref 0.5–1.3)
BLOOD GAS VENOUS - FIO2: 21 — SIGNIFICANT CHANGE UP
BUN SERPL-MCNC: 16 MG/DL — SIGNIFICANT CHANGE UP (ref 7–23)
CALCIUM SERPL-MCNC: 9.2 MG/DL — SIGNIFICANT CHANGE UP (ref 8.4–10.5)
CHLORIDE BLDV-SCNC: 101 MMOL/L — SIGNIFICANT CHANGE UP (ref 96–108)
CHLORIDE SERPL-SCNC: 97 MMOL/L — LOW (ref 98–107)
CO2 SERPL-SCNC: 27 MMOL/L — SIGNIFICANT CHANGE UP (ref 22–31)
CREAT SERPL-MCNC: 0.71 MG/DL — SIGNIFICANT CHANGE UP (ref 0.5–1.3)
EOSINOPHIL NFR FLD: 0 % — SIGNIFICANT CHANGE UP (ref 0–6)
GAS PNL BLDV: 134 MMOL/L — LOW (ref 136–146)
GIANT PLATELETS BLD QL SMEAR: PRESENT — SIGNIFICANT CHANGE UP
GLUCOSE BLDV-MCNC: 123 MG/DL — HIGH (ref 70–99)
GLUCOSE SERPL-MCNC: 131 MG/DL — HIGH (ref 70–99)
HCO3 BLDV-SCNC: 29 MMOL/L — HIGH (ref 20–27)
HCT VFR BLDV CALC: 36.7 % — LOW (ref 39–51)
HGB BLDV-MCNC: 11.9 G/DL — LOW (ref 13–17)
LACTATE BLDV-MCNC: 2.2 MMOL/L — HIGH (ref 0.5–2)
LYMPHOCYTES NFR SPEC AUTO: 6.2 % — LOW (ref 13–44)
MAGNESIUM SERPL-MCNC: 1.7 MG/DL — SIGNIFICANT CHANGE UP (ref 1.6–2.6)
METAMYELOCYTES # FLD: 0 % — SIGNIFICANT CHANGE UP (ref 0–1)
MONOCYTES NFR BLD: 0 % — LOW (ref 2–9)
MYELOCYTES NFR BLD: 0 % — SIGNIFICANT CHANGE UP (ref 0–0)
NEUTROPHIL AB SER-ACNC: 92 % — HIGH (ref 43–77)
NEUTS BAND # BLD: 1.8 % — SIGNIFICANT CHANGE UP (ref 0–6)
OTHER - HEMATOLOGY %: 0 — SIGNIFICANT CHANGE UP
PCO2 BLDV: 42 MMHG — SIGNIFICANT CHANGE UP (ref 41–51)
PH BLDV: 7.46 PH — HIGH (ref 7.32–7.43)
PHOSPHATE SERPL-MCNC: 2.7 MG/DL — SIGNIFICANT CHANGE UP (ref 2.5–4.5)
PLATELET COUNT - ESTIMATE: SIGNIFICANT CHANGE UP
PO2 BLDV: 40 MMHG — SIGNIFICANT CHANGE UP (ref 35–40)
POTASSIUM BLDV-SCNC: 3.6 MMOL/L — SIGNIFICANT CHANGE UP (ref 3.4–4.5)
POTASSIUM SERPL-MCNC: 3.8 MMOL/L — SIGNIFICANT CHANGE UP (ref 3.5–5.3)
POTASSIUM SERPL-SCNC: 3.8 MMOL/L — SIGNIFICANT CHANGE UP (ref 3.5–5.3)
PROMYELOCYTES # FLD: 0 % — SIGNIFICANT CHANGE UP (ref 0–0)
PROT SERPL-MCNC: 7.5 G/DL — SIGNIFICANT CHANGE UP (ref 6–8.3)
SAO2 % BLDV: 70.7 % — SIGNIFICANT CHANGE UP (ref 60–85)
SODIUM SERPL-SCNC: 135 MMOL/L — SIGNIFICANT CHANGE UP (ref 135–145)
VARIANT LYMPHS # BLD: 0 % — SIGNIFICANT CHANGE UP

## 2019-09-20 RX ORDER — FOLIC ACID 0.8 MG
1 TABLET ORAL
Qty: 0 | Refills: 0 | DISCHARGE
Start: 2019-09-20

## 2019-09-20 RX ORDER — SODIUM CHLORIDE 9 MG/ML
1000 INJECTION, SOLUTION INTRAVENOUS ONCE
Refills: 0 | Status: COMPLETED | OUTPATIENT
Start: 2019-09-20 | End: 2019-09-20

## 2019-09-20 RX ORDER — THIAMINE MONONITRATE (VIT B1) 100 MG
100 TABLET ORAL DAILY
Refills: 0 | Status: DISCONTINUED | OUTPATIENT
Start: 2019-09-20 | End: 2019-09-20

## 2019-09-20 RX ORDER — FOLIC ACID 0.8 MG
1 TABLET ORAL DAILY
Refills: 0 | Status: DISCONTINUED | OUTPATIENT
Start: 2019-09-20 | End: 2019-09-20

## 2019-09-20 RX ORDER — SODIUM CHLORIDE 9 MG/ML
1000 INJECTION INTRAMUSCULAR; INTRAVENOUS; SUBCUTANEOUS
Refills: 0 | Status: DISCONTINUED | OUTPATIENT
Start: 2019-09-20 | End: 2019-09-20

## 2019-09-20 RX ORDER — ONDANSETRON 8 MG/1
4 TABLET, FILM COATED ORAL EVERY 6 HOURS
Refills: 0 | Status: DISCONTINUED | OUTPATIENT
Start: 2019-09-20 | End: 2019-09-20

## 2019-09-20 RX ORDER — IBUPROFEN 200 MG
600 TABLET ORAL ONCE
Refills: 0 | Status: COMPLETED | OUTPATIENT
Start: 2019-09-20 | End: 2019-09-20

## 2019-09-20 RX ORDER — INFLUENZA VIRUS VACCINE 15; 15; 15; 15 UG/.5ML; UG/.5ML; UG/.5ML; UG/.5ML
0.5 SUSPENSION INTRAMUSCULAR ONCE
Refills: 0 | Status: DISCONTINUED | OUTPATIENT
Start: 2019-09-20 | End: 2019-09-20

## 2019-09-20 RX ORDER — THIAMINE MONONITRATE (VIT B1) 100 MG
500 TABLET ORAL EVERY 8 HOURS
Refills: 0 | Status: DISCONTINUED | OUTPATIENT
Start: 2019-09-20 | End: 2019-09-20

## 2019-09-20 RX ADMIN — SODIUM CHLORIDE 1000 MILLILITER(S): 9 INJECTION INTRAMUSCULAR; INTRAVENOUS; SUBCUTANEOUS at 00:00

## 2019-09-20 RX ADMIN — Medication 1 MILLIGRAM(S): at 14:00

## 2019-09-20 RX ADMIN — Medication 105 MILLIGRAM(S): at 14:00

## 2019-09-20 RX ADMIN — Medication 1 TABLET(S): at 13:59

## 2019-09-20 RX ADMIN — SODIUM CHLORIDE 1000 MILLILITER(S): 9 INJECTION, SOLUTION INTRAVENOUS at 01:13

## 2019-09-20 RX ADMIN — Medication 2 MILLIGRAM(S): at 04:04

## 2019-09-20 RX ADMIN — Medication 2 MILLIGRAM(S): at 00:38

## 2019-09-20 RX ADMIN — Medication 50 MILLIGRAM(S): at 01:16

## 2019-09-20 RX ADMIN — ONDANSETRON 4 MILLIGRAM(S): 8 TABLET, FILM COATED ORAL at 14:13

## 2019-09-20 RX ADMIN — Medication 600 MILLIGRAM(S): at 13:59

## 2019-09-20 NOTE — H&P ADULT - NSHPREVIEWOFSYSTEMS_GEN_ALL_CORE
CONSTITUTIONAL:  No weight loss, fever, chills, weakness or fatigue.  HEENT:  Eyes:  No visual loss, blurred vision, double vision or yellow sclerae. Ears, Nose, Throat:  No hearing loss, sneezing, congestion, runny nose or sore throat.  SKIN:  No rash or itching.  CARDIOVASCULAR:  No chest pain, chest pressure or chest discomfort. No palpitations or edema.  RESPIRATORY:  No shortness of breath, cough or sputum.  GASTROINTESTINAL:  No anorexia, nausea, vomiting or diarrhea. No abdominal pain or blood.  GENITOURINARY:  Denies hematuria, dysuria.   NEUROLOGICAL:  No headache, dizziness, syncope, paralysis, ataxia, numbness or tingling in the extremities. No change in bowel or bladder control.  MUSCULOSKELETAL:  No muscle, back pain, joint pain or stiffness.  HEMATOLOGIC:  No anemia, bleeding or bruising.  LYMPHATICS:  No enlarged nodes. No history of splenectomy.  PSYCHIATRIC:  No history of depression or anxiety.  ENDOCRINOLOGIC:  No reports of sweating, cold or heat intolerance. No polyuria or polydipsia.  ALLERGIES:  No history of asthma, hives, eczema or rhinitis. CONSTITUTIONAL:  +fatigue No weight loss, fever, chills  HEENT:  Eyes:  No visual loss, blurred vision, double vision or yellow sclerae. Ears, Nose, Throat:  No hearing loss, sneezing, congestion, runny nose or sore throat.  SKIN:  No rash or itching.  CARDIOVASCULAR:  No chest pain, chest pressure or chest discomfort. No palpitations or edema.  RESPIRATORY:  No shortness of breath, cough or sputum.  GASTROINTESTINAL: +Nausea +vomiting No anorexia, diarrhea. No abdominal pain or blood.  GENITOURINARY:  Denies hematuria, dysuria.   NEUROLOGICAL:  No headache, dizziness, syncope, paralysis, ataxia, numbness or tingling in the extremities. No change in bowel or bladder control.  MUSCULOSKELETAL:  No muscle, back pain, joint pain or stiffness.  HEMATOLOGIC:  No anemia, bleeding or bruising.  LYMPHATICS:  No enlarged nodes. No history of splenectomy.  PSYCHIATRIC:  No history of depression or anxiety.  ENDOCRINOLOGIC:  No reports of sweating, cold or heat intolerance. No polyuria or polydipsia.  ALLERGIES:  No history of asthma, hives, eczema or rhinitis.

## 2019-09-20 NOTE — PROGRESS NOTE ADULT - ASSESSMENT
45 yo M w/ PMH of alcohol abuse p/w 1 day h/o of intractable nausea and vomiting admitted for management of alcohol withdrawal

## 2019-09-20 NOTE — H&P ADULT - PROBLEM SELECTOR PLAN 4
-likely 2/2 to alcoholic hepatitis   -f/u pt/inr to calculate discriminant function  -consider RUQ US if liver enzymes continue to uptrend

## 2019-09-20 NOTE — H&P ADULT - PROBLEM SELECTOR PLAN 1
-likely 2/2 to alcohol withdrawal vs gastroenteritis   -continue with supportive care including IVF, antiemetics

## 2019-09-20 NOTE — H&P ADULT - ASSESSMENT
43 yo M w/ PMH of alcohol abuse p/w 1 day h/o of intractable nausea and vomiting admitted for management of alcohol withdrawal

## 2019-09-20 NOTE — PROGRESS NOTE ADULT - PROBLEM SELECTOR PLAN 5
Likely due to bone marrow suppression in the setting of alcohol abuse  - no signs of bleeding  - continue to trend

## 2019-09-20 NOTE — H&P ADULT - PROBLEM SELECTOR PLAN 5
-likely 2/2 to bone marrow suppression in the setting of alcohol abuse  -no signs of bleeding  -continue to trend

## 2019-09-20 NOTE — PROGRESS NOTE ADULT - PROBLEM SELECTOR PLAN 1
Likely due to alcohol withdrawal vs gastroenteritis   - c/w supportive care including IVF, antiemetics  - advance diet as tolerated, monitor for tolerance of PO

## 2019-09-20 NOTE — H&P ADULT - NSHPPHYSICALEXAM_GEN_ALL_CORE
GENERAL APPEARANCE: Well developed, well nourished, alert and cooperative. NAD.   HEENT:  PERRL, EOMI. External auditory canals normal, hearing grossly intact. Minimal tongue fasciculations   NECK: Neck supple, non-tender without lymphadenopathy, masses or thyromegaly.  CARDIAC: Normal S1 and S2. No S3, S4 or murmurs. Rhythm is regular.  LUNGS: Clear to auscultation and percussion without rales, rhonchi, wheezing or diminished breath sounds.  ABDOMEN: Positive bowel sounds. Soft, nondistended, nontender. No guarding or rebound.   MUSCULOSKELETAL: ROM intact spine and extremities. No joint erythema or tenderness.   BACK: normal posture, no spinal deformity, symmetry of spinal muscles, without tenderness, decreased range of motion.  EXTREMITIES: No significant deformity or joint abnormality. No edema. Peripheral pulses intact. No varicosities.  NEUROLOGICAL: CN II-XII intact. Strength and sensation symmetric and intact throughout.   SKIN: Skin normal color, texture and turgor with no lesions or eruptions.  PSYCHIATRIC: AOx3.Normal affect and behavior.

## 2019-09-20 NOTE — DISCHARGE NOTE PROVIDER - HOSPITAL COURSE
45 yo M w/ PMH of alcohol abuse p/w 1 day h/o of intractable nausea and vomiting admitted for management of alcohol withdrawal        Hospital course:             Intractable vomiting with nausea.     -Likely due to alcohol withdrawal vs gastroenteritis     -supportive care including IVF, antiemetics    -advance diet as tolerated            Alcohol withdrawal.      -no tremors, A&Ox3     -symptom triggered CIWA     -thiamine, folic acid and multivitamin     -SW consulted             Transaminitis.     -due to alcoholic use     -trended         Thrombocytopenia.     - Likely due to bone marrow suppression in the setting of alcohol abuse    - no signs of bleeding - trended         Metabolic alkalosis.    - Secondary to intractable vomiting    - IVF, antiemetics    - monitored  BMP.        Need for prophylactic measure.      -Ambulatory, no need for ppx    -PT no needs        Pt with capacity. Pt requested to leave AMA  - understands harm and risks of leaving AMA. Wife plans to take responsibility for patient.        Dispo: 45 yo M w/ PMH of alcohol abuse p/w 1 day h/o of intractable nausea and vomiting admitted for management of alcohol withdrawal        Hospital course:             Intractable vomiting with nausea.     -Likely due to alcohol withdrawal vs gastroenteritis     -supportive care including IVF, antiemetics    -advance diet as tolerated            Alcohol withdrawal.      -no tremors, A&Ox3     -symptom triggered CIWA     -thiamine, folic acid and multivitamin     -SW consulted             Transaminitis.     -due to alcoholic use     -trended         Thrombocytopenia.     - Likely due to bone marrow suppression in the setting of alcohol abuse    - no signs of bleeding - trended         Metabolic alkalosis.    - Secondary to intractable vomiting    - IVF, antiemetics    - monitored  BMP.        Need for prophylactic measure.      -Ambulatory, no need for ppx    -PT no needs        Pt with capacity. Pt requested to leave AMA  - understands harm and risks of leaving AMA. Wife plans to take responsibility for patient.        Dispo: AMA 43 yo M w/ PMH of alcohol abuse p/w 1 day h/o of intractable nausea and vomiting admitted for management of alcohol withdrawal        Intractable vomiting with nausea.     -Likely due to alcohol withdrawal vs gastroenteritis     -supportive care including IVF, antiemetics    -advance diet as tolerated        Alcohol withdrawal.      -no tremors, A&Ox3     -symptom triggered CIWA         Transaminitis.     -due to alcoholic use         Thrombocytopenia.     - Likely due to bone marrow suppression in the setting of alcohol abuse    - no signs of bleeding - trended         Metabolic alkalosis.    - Secondary to intractable vomiting    - IVF, antiemetics        Pt with capacity. Pt requested to leave AMA  - understands harm and risks of leaving AMA. Wife plans to take responsibility for patient.        Dispo: [facility]

## 2019-09-20 NOTE — DISCHARGE NOTE PROVIDER - NSDCCPCAREPLAN_GEN_ALL_CORE_FT
PRINCIPAL DISCHARGE DIAGNOSIS  Diagnosis: Alcohol withdrawal  Assessment and Plan of Treatment: Please abstain from ingesting alcohol in order to optimize your health and prevent adverse events. Continue to supplement with vitamins and follow-up with your primary care provider for further medical care. If you need immediate assistance with substance abuse you may contact the [facility] by calling [phone] open 9am-7pm.
None

## 2019-09-20 NOTE — H&P ADULT - NSHPLABSRESULTS_GEN_ALL_CORE
(09-19 @ 22:44)                      13.4  5.74 )-----------( 120                 39.7    Neutrophils = 5.03 (87.7%)  Lymphocytes = 0.34 (5.9%)  Eosinophils = 0.16 (2.8%)  Basophils = 0.01 (0.2%)  Monocytes = 0.18 (3.1%)  Bands = 1.8%    09-20    135  |  97<L>  |  16  ----------------------------<  131<H>  3.8   |  27  |  0.71    Ca    9.2      20 Sep 2019 04:25  Phos  2.7     09-20  Mg     1.7     09-20    TPro  7.5  /  Alb  4.3  /  TBili  0.8  /  DBili  0.2  /  AST  69<H>  /  ALT  77<H>  /  AlkPhos  85  09-20      Venous Blood Gas:  09-20 @ 02:35  7.46/42/40/29/70.7  VBG Lactate: 2.2  Venous Blood Gas:  09-19 @ 22:44  7.50/40/52/30/84.2  VBG Lactate: 4.5      Labs reviewed   EKG reviewed

## 2019-09-20 NOTE — PROGRESS NOTE ADULT - SUBJECTIVE AND OBJECTIVE BOX
Patient is a 44y old  Male who presents with a chief complaint of alcohol withdrawal    SUBJECTIVE / OVERNIGHT EVENTS:    Seen early this am, wife at bedside    Feels well, states at a bit this am, no CP, SOB, f/c/n/v  Reports wants to go home s/p discussion agreed to day  Last drink Wednesday evening     MEDICATIONS  (STANDING):  folic acid 1 milliGRAM(s) Oral daily  influenza   Vaccine 0.5 milliLiter(s) IntraMuscular once  multivitamin 1 Tablet(s) Oral daily  thiamine IVPB 500 milliGRAM(s) IV Intermittent every 8 hours    MEDICATIONS  (PRN):  LORazepam     Tablet 2 milliGRAM(s) Oral every 2 hours PRN Symptom-triggered 2 point increase in CIWA-Ar  LORazepam     Tablet 2 milliGRAM(s) Oral every 1 hour PRN Symptom-triggered: each CIWA -Ar score 8 or GREATER  ondansetron Injectable 4 milliGRAM(s) IV Push every 6 hours PRN Nausea and/or Vomiting    T(C): 36.7 (09-20-19 @ 14:00), Max: 37.7 (09-20-19 @ 01:21)  HR: 97 (09-20-19 @ 14:00) (87 - 130)  BP: 133/97 (09-20-19 @ 14:00) (129/89 - 173/122)  RR: 18 (09-20-19 @ 14:00) (16 - 97)  SpO2: 99% (09-20-19 @ 14:00) (97% - 100%)    I&O's Summary    20 Sep 2019 07:01  -  20 Sep 2019 17:33  --------------------------------------------------------  IN: 650 mL / OUT: 0 mL / NET: 650 mL    PHYSICAL EXAM:  GENERAL: NAD, well-developed  CHEST/LUNG: Clear to auscultation bilaterally; No wheeze  HEART: Regular rate and rhythm; No murmurs, rubs, or gallops  ABDOMEN: Soft, Nontender, Nondistended; Bowel sounds present  EXTREMITIES:   warm and well perfused, No clubbing, cyanosis, or edema  PSYCH: AAOx3  NEUROLOGY: non-focal, no tremors or tongue fasciculations, slow processing information  SKIN: No rashes or lesions    LABS:                        13.4   5.74  )-----------( 120      ( 19 Sep 2019 22:44 )             39.7     09-20    135  |  97<L>  |  16  ----------------------------<  131<H>  3.8   |  27  |  0.71    Ca    9.2      20 Sep 2019 04:25  Phos  2.7     09-20  Mg     1.7     09-20    TPro  7.5  /  Alb  4.3  /  TBili  0.8  /  DBili  0.2  /  AST  69<H>  /  ALT  77<H>  /  AlkPhos  85  09-20    Microbiology:  [facility] 09-20 @ 02:35  pH: 7.46/pCO2: 42/pO2: 40/HCO3: 29/lactate: 2.2  [facility] 09-19 @ 22:44  pH: 7.50/pCO2: 40/pO2: 52/HCO3: 30/lactate: 4.5    Consultant(s) Notes Reviewed:    Care Discussed with Consultants/Other Providers:

## 2019-09-20 NOTE — CHART NOTE - NSCHARTNOTEFT_GEN_A_CORE
Patient is a 44y old  Male who presents with a chief complaint of alcohol withdrawal (20 Sep 2019 18:41)  Requesting to leave hospital AMA. Pt explained risks of leaving AMA including worsening of symptoms and risk of death. Pt verbalized understanding. Wife at bedside and reports she is taking responsibility for .   Medicine attg updated.

## 2019-09-20 NOTE — H&P ADULT - PROBLEM SELECTOR PLAN 2
-Pt with mild tremors on exam, CIWA scores stable. No prior history of DTs or withdrawal seizures. Has detoxed at home without issues. Initial CIWA of 7  -Will start on symptom triggered CIWA for now  -started on thiamine, folic acid and multivitamin   -SW follow up

## 2019-09-20 NOTE — PROGRESS NOTE ADULT - PROBLEM SELECTOR PLAN 7
Ambulatory, no need for ppx  PT no needs  c/w symptom trigger CIWA, at time of eval pt had capacity, wife present and understood risk/benefit of leaving and he agreed to stay

## 2019-09-20 NOTE — H&P ADULT - HISTORY OF PRESENT ILLNESS
43 yo M w/ PMH of alcohol abuse p/w 1 day h/o of intractable nausea and vomiting. Pt reports he drinks 1/2 liter of vodka daily. Has episodes when he drinks consistently for a couple of weeks adrinks 3 shots of vodka q day for past month, last drink yesterday morning. denies abdominal pain, denies HA, fall, or head trauma. denies h/o alcohol withdrawal or seizures. 45 yo M w/ PMH of alcohol abuse p/w 1 day h/o of intractable nausea and vomiting. Reports nausea with any PO intake. Reports yellow emesis, denies blood. Pt admits to drinking 1/2 liter of vodka daily. Has episodes when he drinks consistently for a couple of weeks and then is sober for a few months in between. States he often will have intractable nausea/vomiting within a couple hours of his last drink and will also gets tremors and feel anxious but has been able to stop drinking on his own. Denies history of seizures or DTs. No prior admissions for alcohol withdrawal. Never has attended rehab. Denies associated fevers, chills, headache, chest pain, abdominal pain, fall, or head trauma. Reports family history significant for alcohol abuse. 43 yo M w/ PMH of alcohol abuse p/w 1 day h/o of intractable nausea and vomiting. Reports nausea with any PO intake. Reports yellow emesis, denies blood. Pt admits to drinking 1/2 liter of vodka daily. Has episodes when he drinks consistently for a couple of weeks and then is sober for a few months in between. States upon cessation of alcohol he often will have intractable nausea/vomiting within a couple hours of his last drink and will also get tremors and feel anxious but has been able to stop drinking on his own. Denies history of seizures or DTs. No prior admissions for alcohol withdrawal. Never has attended rehab. Denies associated fevers, chills, headache, chest pain, abdominal pain, fall, or head trauma. Reports family history significant for alcohol abuse in his father. 45 yo M w/ PMH of alcohol abuse p/w 1 day h/o of intractable nausea and vomiting. Reports nausea with any PO intake. Reports yellow emesis, denies blood. Pt admits to drinking 1/2 liter of vodka daily. Has episodes when he drinks consistently for a couple of weeks and then is sober for a few months in between. States upon cessation of alcohol he often will have intractable nausea/vomiting within a couple hours of his last drink and will also get tremors and feel anxious but has been able to stop drinking on his own. Denies history of seizures or DTs. Last drink on thursday morning. No prior admissions for alcohol withdrawal. Never has attended rehab. Denies associated fevers, chills, headache, chest pain, abdominal pain, fall, or head trauma. Reports family history significant for alcohol abuse in his father.

## 2020-01-11 ENCOUNTER — INPATIENT (INPATIENT)
Facility: HOSPITAL | Age: 45
LOS: 2 days | Discharge: HOME | End: 2020-01-14
Attending: INTERNAL MEDICINE | Admitting: INTERNAL MEDICINE
Payer: SELF-PAY

## 2020-01-11 VITALS
HEART RATE: 127 BPM | OXYGEN SATURATION: 96 % | DIASTOLIC BLOOD PRESSURE: 101 MMHG | RESPIRATION RATE: 16 BRPM | SYSTOLIC BLOOD PRESSURE: 163 MMHG | TEMPERATURE: 99 F

## 2020-01-11 DIAGNOSIS — F10.239 ALCOHOL DEPENDENCE WITH WITHDRAWAL, UNSPECIFIED: ICD-10-CM

## 2020-01-11 DIAGNOSIS — Z02.9 ENCOUNTER FOR ADMINISTRATIVE EXAMINATIONS, UNSPECIFIED: ICD-10-CM

## 2020-01-11 DIAGNOSIS — Z29.9 ENCOUNTER FOR PROPHYLACTIC MEASURES, UNSPECIFIED: ICD-10-CM

## 2020-01-11 DIAGNOSIS — R74.0 NONSPECIFIC ELEVATION OF LEVELS OF TRANSAMINASE AND LACTIC ACID DEHYDROGENASE [LDH]: ICD-10-CM

## 2020-01-11 LAB
ALBUMIN SERPL ELPH-MCNC: 5.2 G/DL — HIGH (ref 3.3–5)
ALP SERPL-CCNC: 98 U/L — SIGNIFICANT CHANGE UP (ref 40–120)
ALT FLD-CCNC: 101 U/L — HIGH (ref 4–41)
ANION GAP SERPL CALC-SCNC: 16 MMO/L — HIGH (ref 7–14)
ANION GAP SERPL CALC-SCNC: 16 MMO/L — HIGH (ref 7–14)
ANION GAP SERPL CALC-SCNC: 35 MMO/L — HIGH (ref 7–14)
APAP SERPL-MCNC: < 15 UG/ML — LOW (ref 15–25)
AST SERPL-CCNC: 130 U/L — HIGH (ref 4–40)
B-OH-BUTYR SERPL-SCNC: 1.5 MMOL/L — HIGH (ref 0–0.4)
BASE EXCESS BLDV CALC-SCNC: -2.2 MMOL/L — SIGNIFICANT CHANGE UP
BASE EXCESS BLDV CALC-SCNC: 4.4 MMOL/L — SIGNIFICANT CHANGE UP
BASE EXCESS BLDV CALC-SCNC: 6.1 MMOL/L — SIGNIFICANT CHANGE UP
BASOPHILS # BLD AUTO: 0.02 K/UL — SIGNIFICANT CHANGE UP (ref 0–0.2)
BASOPHILS NFR BLD AUTO: 0.2 % — SIGNIFICANT CHANGE UP (ref 0–2)
BASOPHILS NFR SPEC: 0.9 % — SIGNIFICANT CHANGE UP (ref 0–2)
BILIRUB SERPL-MCNC: 0.6 MG/DL — SIGNIFICANT CHANGE UP (ref 0.2–1.2)
BLASTS # FLD: 0 % — SIGNIFICANT CHANGE UP (ref 0–0)
BLOOD GAS VENOUS - CREATININE: 0.46 MG/DL — LOW (ref 0.5–1.3)
BLOOD GAS VENOUS - CREATININE: 0.5 MG/DL — SIGNIFICANT CHANGE UP (ref 0.5–1.3)
BLOOD GAS VENOUS - CREATININE: 0.61 MG/DL — SIGNIFICANT CHANGE UP (ref 0.5–1.3)
BUN SERPL-MCNC: 19 MG/DL — SIGNIFICANT CHANGE UP (ref 7–23)
BUN SERPL-MCNC: 20 MG/DL — SIGNIFICANT CHANGE UP (ref 7–23)
BUN SERPL-MCNC: 22 MG/DL — SIGNIFICANT CHANGE UP (ref 7–23)
CALCIUM SERPL-MCNC: 8.8 MG/DL — SIGNIFICANT CHANGE UP (ref 8.4–10.5)
CALCIUM SERPL-MCNC: 8.8 MG/DL — SIGNIFICANT CHANGE UP (ref 8.4–10.5)
CALCIUM SERPL-MCNC: 9.7 MG/DL — SIGNIFICANT CHANGE UP (ref 8.4–10.5)
CHLORIDE BLDV-SCNC: 95 MMOL/L — LOW (ref 96–108)
CHLORIDE BLDV-SCNC: 98 MMOL/L — SIGNIFICANT CHANGE UP (ref 96–108)
CHLORIDE BLDV-SCNC: 99 MMOL/L — SIGNIFICANT CHANGE UP (ref 96–108)
CHLORIDE SERPL-SCNC: 83 MMOL/L — LOW (ref 98–107)
CHLORIDE SERPL-SCNC: 91 MMOL/L — LOW (ref 98–107)
CHLORIDE SERPL-SCNC: 92 MMOL/L — LOW (ref 98–107)
CO2 SERPL-SCNC: 18 MMOL/L — LOW (ref 22–31)
CO2 SERPL-SCNC: 25 MMOL/L — SIGNIFICANT CHANGE UP (ref 22–31)
CO2 SERPL-SCNC: 25 MMOL/L — SIGNIFICANT CHANGE UP (ref 22–31)
CREAT SERPL-MCNC: 0.64 MG/DL — SIGNIFICANT CHANGE UP (ref 0.5–1.3)
CREAT SERPL-MCNC: 0.65 MG/DL — SIGNIFICANT CHANGE UP (ref 0.5–1.3)
CREAT SERPL-MCNC: 0.72 MG/DL — SIGNIFICANT CHANGE UP (ref 0.5–1.3)
EOSINOPHIL # BLD AUTO: 0.14 K/UL — SIGNIFICANT CHANGE UP (ref 0–0.5)
EOSINOPHIL NFR BLD AUTO: 1.4 % — SIGNIFICANT CHANGE UP (ref 0–6)
EOSINOPHIL NFR FLD: 0 % — SIGNIFICANT CHANGE UP (ref 0–6)
ETHANOL BLD-MCNC: 59 MG/DL — HIGH
GAS PNL BLDV: 130 MMOL/L — LOW (ref 136–146)
GAS PNL BLDV: 133 MMOL/L — LOW (ref 136–146)
GAS PNL BLDV: 135 MMOL/L — LOW (ref 136–146)
GIANT PLATELETS BLD QL SMEAR: PRESENT — SIGNIFICANT CHANGE UP
GLUCOSE BLDV-MCNC: 170 MG/DL — HIGH (ref 70–99)
GLUCOSE BLDV-MCNC: 177 MG/DL — HIGH (ref 70–99)
GLUCOSE BLDV-MCNC: 207 MG/DL — HIGH (ref 70–99)
GLUCOSE SERPL-MCNC: 171 MG/DL — HIGH (ref 70–99)
GLUCOSE SERPL-MCNC: 196 MG/DL — HIGH (ref 70–99)
GLUCOSE SERPL-MCNC: 213 MG/DL — HIGH (ref 70–99)
HCO3 BLDV-SCNC: 23 MMOL/L — SIGNIFICANT CHANGE UP (ref 20–27)
HCO3 BLDV-SCNC: 29 MMOL/L — HIGH (ref 20–27)
HCO3 BLDV-SCNC: 30 MMOL/L — HIGH (ref 20–27)
HCT VFR BLD CALC: 41.4 % — SIGNIFICANT CHANGE UP (ref 39–50)
HCT VFR BLDV CALC: 36.5 % — LOW (ref 39–51)
HCT VFR BLDV CALC: 37.5 % — LOW (ref 39–51)
HCT VFR BLDV CALC: 43.4 % — SIGNIFICANT CHANGE UP (ref 39–51)
HGB BLD-MCNC: 13.9 G/DL — SIGNIFICANT CHANGE UP (ref 13–17)
HGB BLDV-MCNC: 11.8 G/DL — LOW (ref 13–17)
HGB BLDV-MCNC: 12.2 G/DL — LOW (ref 13–17)
HGB BLDV-MCNC: 14.1 G/DL — SIGNIFICANT CHANGE UP (ref 13–17)
IMM GRANULOCYTES NFR BLD AUTO: 0.6 % — SIGNIFICANT CHANGE UP (ref 0–1.5)
LACTATE BLDV-MCNC: 11.5 MMOL/L — CRITICAL HIGH (ref 0.5–2)
LACTATE BLDV-MCNC: 2.7 MMOL/L — HIGH (ref 0.5–2)
LACTATE BLDV-MCNC: 3.6 MMOL/L — HIGH (ref 0.5–2)
LIDOCAIN IGE QN: 62.4 U/L — HIGH (ref 7–60)
LYMPHOCYTES # BLD AUTO: 0.76 K/UL — LOW (ref 1–3.3)
LYMPHOCYTES # BLD AUTO: 7.6 % — LOW (ref 13–44)
LYMPHOCYTES NFR SPEC AUTO: 6.2 % — LOW (ref 13–44)
MAGNESIUM SERPL-MCNC: 1.7 MG/DL — SIGNIFICANT CHANGE UP (ref 1.6–2.6)
MCHC RBC-ENTMCNC: 28.1 PG — SIGNIFICANT CHANGE UP (ref 27–34)
MCHC RBC-ENTMCNC: 33.6 % — SIGNIFICANT CHANGE UP (ref 32–36)
MCV RBC AUTO: 83.6 FL — SIGNIFICANT CHANGE UP (ref 80–100)
METAMYELOCYTES # FLD: 0 % — SIGNIFICANT CHANGE UP (ref 0–1)
MONOCYTES # BLD AUTO: 0.16 K/UL — SIGNIFICANT CHANGE UP (ref 0–0.9)
MONOCYTES NFR BLD AUTO: 1.6 % — LOW (ref 2–14)
MONOCYTES NFR BLD: 1.8 % — LOW (ref 2–9)
MYELOCYTES NFR BLD: 0 % — SIGNIFICANT CHANGE UP (ref 0–0)
NEUTROPHIL AB SER-ACNC: 83 % — HIGH (ref 43–77)
NEUTROPHILS # BLD AUTO: 8.89 K/UL — HIGH (ref 1.8–7.4)
NEUTROPHILS NFR BLD AUTO: 88.6 % — HIGH (ref 43–77)
NEUTS BAND # BLD: 4.5 % — SIGNIFICANT CHANGE UP (ref 0–6)
NRBC # FLD: 0 K/UL — SIGNIFICANT CHANGE UP (ref 0–0)
OTHER - HEMATOLOGY %: 0 — SIGNIFICANT CHANGE UP
OVALOCYTES BLD QL SMEAR: SLIGHT — SIGNIFICANT CHANGE UP
PCO2 BLDV: 30 MMHG — LOW (ref 41–51)
PCO2 BLDV: 37 MMHG — LOW (ref 41–51)
PCO2 BLDV: 38 MMHG — LOW (ref 41–51)
PH BLDV: 7.46 PH — HIGH (ref 7.32–7.43)
PH BLDV: 7.48 PH — HIGH (ref 7.32–7.43)
PH BLDV: 7.51 PH — HIGH (ref 7.32–7.43)
PHOSPHATE SERPL-MCNC: 2.4 MG/DL — LOW (ref 2.5–4.5)
PLATELET # BLD AUTO: 147 K/UL — LOW (ref 150–400)
PLATELET COUNT - ESTIMATE: SIGNIFICANT CHANGE UP
PMV BLD: 11.2 FL — SIGNIFICANT CHANGE UP (ref 7–13)
PO2 BLDV: 133 MMHG — HIGH (ref 35–40)
PO2 BLDV: 64 MMHG — HIGH (ref 35–40)
PO2 BLDV: 80 MMHG — HIGH (ref 35–40)
POIKILOCYTOSIS BLD QL AUTO: SLIGHT — SIGNIFICANT CHANGE UP
POTASSIUM BLDV-SCNC: 3.7 MMOL/L — SIGNIFICANT CHANGE UP (ref 3.4–4.5)
POTASSIUM BLDV-SCNC: 3.8 MMOL/L — SIGNIFICANT CHANGE UP (ref 3.4–4.5)
POTASSIUM BLDV-SCNC: 4.2 MMOL/L — SIGNIFICANT CHANGE UP (ref 3.4–4.5)
POTASSIUM SERPL-MCNC: 3.9 MMOL/L — SIGNIFICANT CHANGE UP (ref 3.5–5.3)
POTASSIUM SERPL-MCNC: 4.1 MMOL/L — SIGNIFICANT CHANGE UP (ref 3.5–5.3)
POTASSIUM SERPL-MCNC: 4.3 MMOL/L — SIGNIFICANT CHANGE UP (ref 3.5–5.3)
POTASSIUM SERPL-SCNC: 3.9 MMOL/L — SIGNIFICANT CHANGE UP (ref 3.5–5.3)
POTASSIUM SERPL-SCNC: 4.1 MMOL/L — SIGNIFICANT CHANGE UP (ref 3.5–5.3)
POTASSIUM SERPL-SCNC: 4.3 MMOL/L — SIGNIFICANT CHANGE UP (ref 3.5–5.3)
PROMYELOCYTES # FLD: 0 % — SIGNIFICANT CHANGE UP (ref 0–0)
PROT SERPL-MCNC: 8.8 G/DL — HIGH (ref 6–8.3)
RBC # BLD: 4.95 M/UL — SIGNIFICANT CHANGE UP (ref 4.2–5.8)
RBC # FLD: 14.2 % — SIGNIFICANT CHANGE UP (ref 10.3–14.5)
SALICYLATES SERPL-MCNC: < 5 MG/DL — LOW (ref 15–30)
SAO2 % BLDV: 92.3 % — HIGH (ref 60–85)
SAO2 % BLDV: 96.9 % — HIGH (ref 60–85)
SAO2 % BLDV: 98.5 % — HIGH (ref 60–85)
SODIUM SERPL-SCNC: 132 MMOL/L — LOW (ref 135–145)
SODIUM SERPL-SCNC: 133 MMOL/L — LOW (ref 135–145)
SODIUM SERPL-SCNC: 136 MMOL/L — SIGNIFICANT CHANGE UP (ref 135–145)
TARGETS BLD QL SMEAR: SLIGHT — SIGNIFICANT CHANGE UP
VARIANT LYMPHS # BLD: 3.6 % — SIGNIFICANT CHANGE UP
WBC # BLD: 10.03 K/UL — SIGNIFICANT CHANGE UP (ref 3.8–10.5)
WBC # FLD AUTO: 10.03 K/UL — SIGNIFICANT CHANGE UP (ref 3.8–10.5)

## 2020-01-11 PROCEDURE — 71045 X-RAY EXAM CHEST 1 VIEW: CPT | Mod: 26

## 2020-01-11 PROCEDURE — 99223 1ST HOSP IP/OBS HIGH 75: CPT | Mod: GC

## 2020-01-11 RX ORDER — DIAZEPAM 5 MG
40 TABLET ORAL ONCE
Refills: 0 | Status: DISCONTINUED | OUTPATIENT
Start: 2020-01-11 | End: 2020-01-11

## 2020-01-11 RX ORDER — ONDANSETRON 8 MG/1
4 TABLET, FILM COATED ORAL EVERY 8 HOURS
Refills: 0 | Status: DISCONTINUED | OUTPATIENT
Start: 2020-01-11 | End: 2020-01-11

## 2020-01-11 RX ORDER — DIAZEPAM 5 MG
20 TABLET ORAL ONCE
Refills: 0 | Status: DISCONTINUED | OUTPATIENT
Start: 2020-01-11 | End: 2020-01-11

## 2020-01-11 RX ORDER — THIAMINE MONONITRATE (VIT B1) 100 MG
100 TABLET ORAL ONCE
Refills: 0 | Status: COMPLETED | OUTPATIENT
Start: 2020-01-11 | End: 2020-01-11

## 2020-01-11 RX ORDER — THIAMINE MONONITRATE (VIT B1) 100 MG
100 TABLET ORAL DAILY
Refills: 0 | Status: DISCONTINUED | OUTPATIENT
Start: 2020-01-11 | End: 2020-01-14

## 2020-01-11 RX ORDER — FAMOTIDINE 10 MG/ML
20 INJECTION INTRAVENOUS ONCE
Refills: 0 | Status: COMPLETED | OUTPATIENT
Start: 2020-01-11 | End: 2020-01-11

## 2020-01-11 RX ORDER — SODIUM CHLORIDE 9 MG/ML
1000 INJECTION, SOLUTION INTRAVENOUS
Refills: 0 | Status: DISCONTINUED | OUTPATIENT
Start: 2020-01-11 | End: 2020-01-14

## 2020-01-11 RX ORDER — ONDANSETRON 8 MG/1
4 TABLET, FILM COATED ORAL ONCE
Refills: 0 | Status: COMPLETED | OUTPATIENT
Start: 2020-01-11 | End: 2020-01-11

## 2020-01-11 RX ORDER — SODIUM CHLORIDE 9 MG/ML
1000 INJECTION INTRAMUSCULAR; INTRAVENOUS; SUBCUTANEOUS ONCE
Refills: 0 | Status: COMPLETED | OUTPATIENT
Start: 2020-01-11 | End: 2020-01-11

## 2020-01-11 RX ORDER — ONDANSETRON 8 MG/1
4 TABLET, FILM COATED ORAL EVERY 8 HOURS
Refills: 0 | Status: DISCONTINUED | OUTPATIENT
Start: 2020-01-11 | End: 2020-01-14

## 2020-01-11 RX ORDER — FOLIC ACID 0.8 MG
1 TABLET ORAL DAILY
Refills: 0 | Status: DISCONTINUED | OUTPATIENT
Start: 2020-01-11 | End: 2020-01-14

## 2020-01-11 RX ORDER — DIAZEPAM 5 MG
10 TABLET ORAL ONCE
Refills: 0 | Status: DISCONTINUED | OUTPATIENT
Start: 2020-01-11 | End: 2020-01-11

## 2020-01-11 RX ORDER — METOCLOPRAMIDE HCL 10 MG
10 TABLET ORAL ONCE
Refills: 0 | Status: COMPLETED | OUTPATIENT
Start: 2020-01-11 | End: 2020-01-11

## 2020-01-11 RX ADMIN — Medication 20 MILLIGRAM(S): at 12:28

## 2020-01-11 RX ADMIN — Medication 1 MILLIGRAM(S): at 18:03

## 2020-01-11 RX ADMIN — Medication 20 MILLIGRAM(S): at 14:58

## 2020-01-11 RX ADMIN — Medication 100 MILLIGRAM(S): at 12:28

## 2020-01-11 RX ADMIN — ONDANSETRON 4 MILLIGRAM(S): 8 TABLET, FILM COATED ORAL at 12:52

## 2020-01-11 RX ADMIN — FAMOTIDINE 20 MILLIGRAM(S): 10 INJECTION INTRAVENOUS at 11:05

## 2020-01-11 RX ADMIN — ONDANSETRON 4 MILLIGRAM(S): 8 TABLET, FILM COATED ORAL at 11:00

## 2020-01-11 RX ADMIN — Medication 100 MILLIGRAM(S): at 18:03

## 2020-01-11 RX ADMIN — SODIUM CHLORIDE 1000 MILLILITER(S): 9 INJECTION INTRAMUSCULAR; INTRAVENOUS; SUBCUTANEOUS at 11:48

## 2020-01-11 RX ADMIN — SODIUM CHLORIDE 1000 MILLILITER(S): 9 INJECTION INTRAMUSCULAR; INTRAVENOUS; SUBCUTANEOUS at 11:53

## 2020-01-11 RX ADMIN — Medication 10 MILLIGRAM(S): at 11:09

## 2020-01-11 RX ADMIN — SODIUM CHLORIDE 125 MILLILITER(S): 9 INJECTION, SOLUTION INTRAVENOUS at 13:36

## 2020-01-11 RX ADMIN — Medication 63.75 MILLIMOLE(S): at 19:11

## 2020-01-11 RX ADMIN — Medication 4 MILLIGRAM(S): at 20:47

## 2020-01-11 RX ADMIN — SODIUM CHLORIDE 1000 MILLILITER(S): 9 INJECTION INTRAMUSCULAR; INTRAVENOUS; SUBCUTANEOUS at 11:09

## 2020-01-11 RX ADMIN — Medication 10 MILLIGRAM(S): at 14:57

## 2020-01-11 RX ADMIN — SODIUM CHLORIDE 125 MILLILITER(S): 9 INJECTION, SOLUTION INTRAVENOUS at 22:15

## 2020-01-11 RX ADMIN — Medication 4 MILLIGRAM(S): at 16:45

## 2020-01-11 RX ADMIN — SODIUM CHLORIDE 1000 MILLILITER(S): 9 INJECTION INTRAMUSCULAR; INTRAVENOUS; SUBCUTANEOUS at 14:32

## 2020-01-11 NOTE — ED ADULT NURSE NOTE - ED STAT RN HANDOFF DETAILS
Patient is A&Ox3, aware of plan of care, and has ESS[facility] 1 room available.  Report given to ESSU 1 RN.  Patient awaiting transportation.  Will continue to monitor patient closely. ERWIN Piedra R.N.

## 2020-01-11 NOTE — H&P ADULT - PROBLEM SELECTOR PLAN 1
Pt with hx of alcohol abuse and withdrawals in the past with multiple documented presentations to the ED including an AMA in september 2019 p/w N/V in the setting of binge drinking the past two weeks with last drink yesterday afternoon.   -No hx of ICU admission, DT, or seizures in the past  -CIWA monitoring  -Ativan taper  -Thiamine, folic acid  -cont to monitor

## 2020-01-11 NOTE — H&P ADULT - ATTENDING COMMENTS
Pt seen and examined  Case d/w / Aaliyah Pt seen and examined  Case d/w Dr Fischer Pt seen and examined on 1/11/2020  Case d/w Dr Fischer

## 2020-01-11 NOTE — H&P ADULT - NSHPLABSRESULTS_GEN_ALL_CORE
CBC Full  -  ( 11 Jan 2020 10:40 )  WBC Count : 10.03 K/uL  RBC Count : 4.95 M/uL  Hemoglobin : 13.9 g/dL  Hematocrit : 41.4 %  Platelet Count - Automated : 147 K/uL  Mean Cell Volume : 83.6 fL  Mean Cell Hemoglobin : 28.1 pg  Mean Cell Hemoglobin Concentration : 33.6 %  Auto Neutrophil # : 8.89 K/uL  Auto Lymphocyte # : 0.76 K/uL  Auto Monocyte # : 0.16 K/uL  Auto Eosinophil # : 0.14 K/uL  Auto Basophil # : 0.02 K/uL  Auto Neutrophil % : 88.6 %  Auto Lymphocyte % : 7.6 %  Auto Monocyte % : 1.6 %  Auto Eosinophil % : 1.4 %  Auto Basophil % : 0.2 %    01-11    136  |  83<L>  |  22  ----------------------------<  171<H>  3.9   |  18<L>  |  0.72    Ca    9.7      11 Jan 2020 10:40    TPro  8.8<H>  /  Alb  5.2<H>  /  TBili  0.6  /  DBili  x   /  AST  130<H>  /  ALT  101<H>  /  AlkPhos  98  01-11    VBG: lactate: 3.6 pH: 7.48 pCO2 - 38  Alcohol level: 59

## 2020-01-11 NOTE — ED PROVIDER NOTE - NS ED ROS FT
GENERAL: No fever or chills  EYES: no change in vision  HEENT: no trouble swallowing or speaking  CARDIAC: no chest pain or palpitations  PULMONARY: no cough or SOB  GI: abd pain, nausea, vomiting   : No changes in urination  SKIN: no rashes  NEURO: tremors, no headache, numbness, or weakness  MSK: No joint pain

## 2020-01-11 NOTE — ED PROVIDER NOTE - OBJECTIVE STATEMENT
44M with pmh ETOH abuse presenting with abd pain, nausea, vomiting, tremors. States fell off the wagon, binge drinking 44M with pmh ETOH abuse, withdrawal, no seizures/DT presenting with abd pain, nausea, vomiting, tremors. States fell off the wagon, binge drinking past 2 weeks 44M with pmh ETOH abuse, withdrawal, no seizures/DT presenting with abd pain, nausea, vomiting, tremors. States fell off the wagon, binge drinking past 2 weeks. Wife convinced him to stop drinking but began having withdrawal symptoms. Also endorses mild cough, sore throat since vomiting. Denies fever, chills, cp, sob, dizziness 44M with pmh ETOH abuse, withdrawal, no seizures/DT presenting with abd pain, nausea, vomiting, tremors. States fell off the wagon, binge drinking past 2 weeks. Last drink yesteday afternoon. Wife convinced him to stop drinking but began having withdrawal symptoms with >20 episodes of vomiting. Also endorses mild cough, sore throat since vomiting. Denies fever, chills, cp, sob, dizziness, hematemesis

## 2020-01-11 NOTE — ED PROVIDER NOTE - CARE PLAN
Principal Discharge DX:	Alcohol withdrawal Principal Discharge DX:	Alcohol withdrawal  Secondary Diagnosis:	Alcoholic ketoacidosis  Secondary Diagnosis:	Lactic acid acidosis

## 2020-01-11 NOTE — H&P ADULT - PROBLEM SELECTOR PLAN 2
Pt with hx of transaminitis on presentations in the past now with elevation from known baseline  -Likely related to underlying alcoholic hepatitis  -Has had RUQ US and CT A/P in 2019 demonstrating hepatic steatosis  -cont to trend CMP

## 2020-01-11 NOTE — H&P ADULT - NSHPPHYSICALEXAM_GEN_ALL_CORE
PHYSICAL EXAM:      Constitutional:    Eyes:    ENMT:    Neck:    Breasts:    Back:    Respiratory:    Cardiovascular:    Gastrointestinal:    Genitourinary:    Rectal:    Extremities:    Vascular:    Neurological:    Skin:    Lymph Nodes:    Musculoskeletal:    Psychiatric: PHYSICAL EXAM:  Constitutional: lethargic  Eyes: EOMI  Neck: supple, no jvd  Respiratory: CTA b/l  Cardiovascular: RRR  Gastrointestinal:soft, nt, nd  Extremities: no LE edema  Vascular:2+ pulses  Neurological: no focal deficits

## 2020-01-11 NOTE — ED PROVIDER NOTE - PROGRESS NOTE DETAILS
Gm: pt lactated elevated, will give thiamine, dextrose, IVF, and reassess. pt still tremulous, tachycardic and hypertensive after 10mg of valium. will give additional valium and reassess. Gm: pt s/p 20mg IV valium and stil awake and alert, c/o not feeling well and tachycardic at 120. will give additional meds. Gm: pt s/p 20mg IV valium and still awake and alert, c/o not feeling well and tachycardic at 120. will give additional meds. Gm: pt now sleeping comfortably, will continue to reassess. Gm: pt now awake and alert again, tachy and tremulous. will give additional valium and reassess.

## 2020-01-11 NOTE — ED ADULT TRIAGE NOTE - CHIEF COMPLAINT QUOTE
c/o mid abdominal pain with nausea/vomiting, sore throat, cough, runny nose since yesterday. Pt states he vomited approx 20 times since yesterday. Denies any fever/chills. Denies PMH.

## 2020-01-11 NOTE — ED ADULT NURSE NOTE - OBJECTIVE STATEMENT
brought tin from home by wife for etoh abuse, pt c/o abdominal pain, throat discomfort and vomiting since last night, as per patient he has not had a drink in six months and past two weeks has been drinking daily, pt has been having withdrawal symptoms, abdomen soft and non tender, sinus tach on monitor, slight tremors noted, safety maintained wife at bedside,

## 2020-01-11 NOTE — H&P ADULT - ASSESSMENT
43 yo M with PMH of EtOH abuse/withdrawal in the past with previous admission in September 2019 with subsequent AMA p/w nausea/vomiting after binge drinking the past 2 weeks with last drink yesterday afternoon 1/10/20.

## 2020-01-11 NOTE — H&P ADULT - HISTORY OF PRESENT ILLNESS
44M with pmh ETOH abuse, withdrawal, no seizures/DT presenting with abd pain, nausea, vomiting, tremors. States fell off the wagon, binge drinking past 2 weeks. Last drink yesteday afternoon. Wife convinced him to stop drinking but began having withdrawal symptoms with >20 episodes of vomiting. Also endorses mild cough, sore throat since vomiting. Denies fever, chills, cp, sob, dizziness, hematemesis    Patient admitted on 9/20/19 for similar symptoms and AMA the same day. Patient had multiple presentations to the ER with similar findings.

## 2020-01-11 NOTE — ED PROVIDER NOTE - CRITICAL CARE PROVIDED
consult w/ pt's family directly relating to pts condition/direct patient care (not related to procedure)/additional history taking/consultation with other physicians/documentation/interpretation of diagnostic studies

## 2020-01-12 LAB
ALBUMIN SERPL ELPH-MCNC: 3.9 G/DL — SIGNIFICANT CHANGE UP (ref 3.3–5)
ALP SERPL-CCNC: 75 U/L — SIGNIFICANT CHANGE UP (ref 40–120)
ALT FLD-CCNC: 66 U/L — HIGH (ref 4–41)
ANION GAP SERPL CALC-SCNC: 14 MMO/L — SIGNIFICANT CHANGE UP (ref 7–14)
APPEARANCE UR: CLEAR — SIGNIFICANT CHANGE UP
AST SERPL-CCNC: 78 U/L — HIGH (ref 4–40)
BASE EXCESS BLDV CALC-SCNC: 4.6 MMOL/L — SIGNIFICANT CHANGE UP
BILIRUB SERPL-MCNC: 1 MG/DL — SIGNIFICANT CHANGE UP (ref 0.2–1.2)
BILIRUB UR-MCNC: NEGATIVE — SIGNIFICANT CHANGE UP
BLOOD UR QL VISUAL: NEGATIVE — SIGNIFICANT CHANGE UP
BUN SERPL-MCNC: 13 MG/DL — SIGNIFICANT CHANGE UP (ref 7–23)
CALCIUM SERPL-MCNC: 8.9 MG/DL — SIGNIFICANT CHANGE UP (ref 8.4–10.5)
CHLORIDE SERPL-SCNC: 96 MMOL/L — LOW (ref 98–107)
CO2 SERPL-SCNC: 25 MMOL/L — SIGNIFICANT CHANGE UP (ref 22–31)
COLOR SPEC: COLORLESS — SIGNIFICANT CHANGE UP
CREAT SERPL-MCNC: 0.67 MG/DL — SIGNIFICANT CHANGE UP (ref 0.5–1.3)
GAS PNL BLDV: 133 MMOL/L — LOW (ref 136–146)
GLUCOSE BLDV-MCNC: 137 MG/DL — HIGH (ref 70–99)
GLUCOSE SERPL-MCNC: 130 MG/DL — HIGH (ref 70–99)
GLUCOSE UR-MCNC: NEGATIVE — SIGNIFICANT CHANGE UP
HBA1C BLD-MCNC: 5.6 % — SIGNIFICANT CHANGE UP (ref 4–5.6)
HCO3 BLDV-SCNC: 29 MMOL/L — HIGH (ref 20–27)
HCT VFR BLD CALC: 35.7 % — LOW (ref 39–50)
HCT VFR BLDV CALC: 35.7 % — LOW (ref 39–51)
HGB BLD-MCNC: 11.5 G/DL — LOW (ref 13–17)
HGB BLDV-MCNC: 11.6 G/DL — LOW (ref 13–17)
KETONES UR-MCNC: NEGATIVE — SIGNIFICANT CHANGE UP
LEUKOCYTE ESTERASE UR-ACNC: NEGATIVE — SIGNIFICANT CHANGE UP
MAGNESIUM SERPL-MCNC: 1.9 MG/DL — SIGNIFICANT CHANGE UP (ref 1.6–2.6)
MANUAL SMEAR VERIFICATION: SIGNIFICANT CHANGE UP
MCHC RBC-ENTMCNC: 27.6 PG — SIGNIFICANT CHANGE UP (ref 27–34)
MCHC RBC-ENTMCNC: 32.2 % — SIGNIFICANT CHANGE UP (ref 32–36)
MCV RBC AUTO: 85.8 FL — SIGNIFICANT CHANGE UP (ref 80–100)
NITRITE UR-MCNC: NEGATIVE — SIGNIFICANT CHANGE UP
NRBC # FLD: 0 K/UL — SIGNIFICANT CHANGE UP (ref 0–0)
PCO2 BLDV: 40 MMHG — LOW (ref 41–51)
PH BLDV: 7.47 PH — HIGH (ref 7.32–7.43)
PH UR: 7.5 — SIGNIFICANT CHANGE UP (ref 5–8)
PHOSPHATE SERPL-MCNC: 3.1 MG/DL — SIGNIFICANT CHANGE UP (ref 2.5–4.5)
PLATELET # BLD AUTO: 93 K/UL — LOW (ref 150–400)
PMV BLD: 10.8 FL — SIGNIFICANT CHANGE UP (ref 7–13)
PO2 BLDV: 72 MMHG — HIGH (ref 35–40)
POTASSIUM BLDV-SCNC: 2.9 MMOL/L — CRITICAL LOW (ref 3.4–4.5)
POTASSIUM SERPL-MCNC: 3 MMOL/L — LOW (ref 3.5–5.3)
POTASSIUM SERPL-SCNC: 3 MMOL/L — LOW (ref 3.5–5.3)
PROT SERPL-MCNC: 6.6 G/DL — SIGNIFICANT CHANGE UP (ref 6–8.3)
PROT UR-MCNC: NEGATIVE — SIGNIFICANT CHANGE UP
RBC # BLD: 4.16 M/UL — LOW (ref 4.2–5.8)
RBC # FLD: 14.5 % — SIGNIFICANT CHANGE UP (ref 10.3–14.5)
SAO2 % BLDV: 93.9 % — HIGH (ref 60–85)
SODIUM SERPL-SCNC: 135 MMOL/L — SIGNIFICANT CHANGE UP (ref 135–145)
SP GR SPEC: 1 — LOW (ref 1–1.04)
UROBILINOGEN FLD QL: NORMAL — SIGNIFICANT CHANGE UP
WBC # BLD: 3.85 K/UL — SIGNIFICANT CHANGE UP (ref 3.8–10.5)
WBC # FLD AUTO: 3.85 K/UL — SIGNIFICANT CHANGE UP (ref 3.8–10.5)

## 2020-01-12 PROCEDURE — 99233 SBSQ HOSP IP/OBS HIGH 50: CPT | Mod: GC

## 2020-01-12 RX ORDER — INFLUENZA VIRUS VACCINE 15; 15; 15; 15 UG/.5ML; UG/.5ML; UG/.5ML; UG/.5ML
0.5 SUSPENSION INTRAMUSCULAR ONCE
Refills: 0 | Status: DISCONTINUED | OUTPATIENT
Start: 2020-01-12 | End: 2020-01-14

## 2020-01-12 RX ORDER — POTASSIUM CHLORIDE 20 MEQ
40 PACKET (EA) ORAL EVERY 4 HOURS
Refills: 0 | Status: COMPLETED | OUTPATIENT
Start: 2020-01-12 | End: 2020-01-12

## 2020-01-12 RX ADMIN — Medication 2 MILLIGRAM(S): at 22:39

## 2020-01-12 RX ADMIN — Medication 4 MILLIGRAM(S): at 04:32

## 2020-01-12 RX ADMIN — Medication 40 MILLIEQUIVALENT(S): at 17:25

## 2020-01-12 RX ADMIN — Medication 4 MILLIGRAM(S): at 00:56

## 2020-01-12 RX ADMIN — SODIUM CHLORIDE 125 MILLILITER(S): 9 INJECTION, SOLUTION INTRAVENOUS at 22:42

## 2020-01-12 RX ADMIN — ONDANSETRON 4 MILLIGRAM(S): 8 TABLET, FILM COATED ORAL at 08:40

## 2020-01-12 RX ADMIN — SODIUM CHLORIDE 125 MILLILITER(S): 9 INJECTION, SOLUTION INTRAVENOUS at 14:05

## 2020-01-12 RX ADMIN — Medication 4 MILLIGRAM(S): at 08:41

## 2020-01-12 NOTE — PROGRESS NOTE ADULT - SUBJECTIVE AND OBJECTIVE BOX
Ran Fischer, PGY-1  [phone]    Patient is a 44y old  Male who presents with a chief complaint of Alcohol Withdrawal (11 Jan 2020 16:22)      SUBJECTIVE/OVERNIGHT EVENTS: No AE ON. Patient seen and examined at bedside this AM.      MEDICATIONS  (STANDING):  dextrose 5% + sodium chloride 0.9%. 1000 milliLiter(s) (125 mL/Hr) IV Continuous <Continuous>  folic acid 1 milliGRAM(s) Oral daily  LORazepam   Injectable 4 milliGRAM(s) IV Push every 4 hours  LORazepam   Injectable   IV Push   LORazepam   Injectable 3 milliGRAM(s) IV Push every 4 hours  thiamine 100 milliGRAM(s) Oral daily    MEDICATIONS  (PRN):  LORazepam     Tablet 2 milliGRAM(s) Oral every 2 hours PRN Symptom-triggered 2 point increase in CIWA-Ar  LORazepam   Injectable 2 milliGRAM(s) IntraMuscular every 1 hour PRN Symptom-triggered: each CIWA -Ar score 8 or GREATER  ondansetron Injectable 4 milliGRAM(s) IV Push every 8 hours PRN Nausea and/or Vomiting    CAPILLARY BLOOD GLUCOSE        I&O's Summary        Vital Signs Last 24 Hrs  T(C): 36.9 (12 Jan 2020 04:29), Max: 37.3 (11 Jan 2020 20:43)  T(F): 98.4 (12 Jan 2020 04:29), Max: 99.1 (11 Jan 2020 20:43)  HR: 105 (12 Jan 2020 04:29) (101 - 128)  BP: 153/98 (12 Jan 2020 04:29) (139/85 - 178/89)  BP(mean): 117 (11 Jan 2020 13:31) (117 - 117)  RR: 18 (12 Jan 2020 04:29) (15 - 19)  SpO2: 99% (12 Jan 2020 04:29) (96% - 100%)    PHYSICAL EXAM:  GENERAL: NAD, well-developed  HEAD:  Atraumatic, Normocephalic  EYES: EOMI, PERRLA, conjunctiva and sclera clear  NECK: Supple, No JVD  CHEST/LUNG: Clear to auscultation bilaterally; No wheeze  HEART: Regular rate and rhythm; No murmurs, rubs, or gallops  ABDOMEN: Soft, Nontender, Nondistended; Bowel sounds present  EXTREMITIES:  2+ Peripheral Pulses, No clubbing, cyanosis, or edema  PSYCH: AAOx3  NEUROLOGY: non-focal  SKIN: No rashes or lesions    LABS                        13.9   10.03 )-----------( 147      ( 11 Jan 2020 10:40 )             41.4     01-11    132<L>  |  91<L>  |  19  ----------------------------<  213<H>  4.1   |  25  |  0.65  01-11    133<L>  |  92<L>  |  20  ----------------------------<  196<H>  4.3   |  25  |  0.64    Ca    8.8      11 Jan 2020 16:23  Ca    8.8      11 Jan 2020 15:00  Phos  2.4     01-11  Mg     1.7     01-11    TPro  8.8<H>  /  Alb  5.2<H>  /  TBili  0.6  /  DBili  x   /  AST  130<H>  /  ALT  101<H>  /  AlkPhos  98  01-11 01-11-20 @ 16:23 - VBG - pH: 7.51  | pCO2: 37    | pO2: 80    | Lactate: 2.7    01-11-20 @ 14:30 - VBG - pH: 7.48  | pCO2: 38    | pO2: 64    | Lactate: 3.6        RADIOLOGY & ADDITIONAL TESTS:    Imaging Personally Reviewed:    Consultant(s) Notes Reviewed:      Care Discussed with Consultants/Other Providers: Ran Fischer, PGY-1  [phone]    Patient is a 44y old  Male who presents with a chief complaint of Alcohol Withdrawal (11 Jan 2020 16:22)      SUBJECTIVE/OVERNIGHT EVENTS: No AE ON. Patient seen and examined at bedside this AM.  Generally not feeling well. Nonspecific. Would like to go home. Explained to pt the reasoning behind why he cannot. Denies hallucinations. Denies SOB, CP.     MEDICATIONS  (STANDING):  dextrose 5% + sodium chloride 0.9%. 1000 milliLiter(s) (125 mL/Hr) IV Continuous <Continuous>  folic acid 1 milliGRAM(s) Oral daily  LORazepam   Injectable 4 milliGRAM(s) IV Push every 4 hours  LORazepam   Injectable   IV Push   LORazepam   Injectable 3 milliGRAM(s) IV Push every 4 hours  thiamine 100 milliGRAM(s) Oral daily    MEDICATIONS  (PRN):  LORazepam     Tablet 2 milliGRAM(s) Oral every 2 hours PRN Symptom-triggered 2 point increase in CIWA-Ar  LORazepam   Injectable 2 milliGRAM(s) IntraMuscular every 1 hour PRN Symptom-triggered: each CIWA -Ar score 8 or GREATER  ondansetron Injectable 4 milliGRAM(s) IV Push every 8 hours PRN Nausea and/or Vomiting    CAPILLARY BLOOD GLUCOSE        I&O's Summary        Vital Signs Last 24 Hrs  T(C): 36.9 (12 Jan 2020 04:29), Max: 37.3 (11 Jan 2020 20:43)  T(F): 98.4 (12 Jan 2020 04:29), Max: 99.1 (11 Jan 2020 20:43)  HR: 105 (12 Jan 2020 04:29) (101 - 128)  BP: 153/98 (12 Jan 2020 04:29) (139/85 - 178/89)  BP(mean): 117 (11 Jan 2020 13:31) (117 - 117)  RR: 18 (12 Jan 2020 04:29) (15 - 19)  SpO2: 99% (12 Jan 2020 04:29) (96% - 100%)    PHYSICAL EXAM:  GENERAL: NAD, well-developed  HEAD:  Atraumatic, Normocephalic  EYES: EOMI, PERRLA, conjunctiva and sclera clear  NECK: Supple, No JVD  CHEST/LUNG: Clear to auscultation bilaterally; No wheeze  HEART: Regular rate and rhythm; No murmurs, rubs, or gallops  ABDOMEN: Soft, Nontender, Nondistended; Bowel sounds present  EXTREMITIES:  2+ Peripheral Pulses, No clubbing, cyanosis, or edema  PSYCH: AAOx3  NEUROLOGY: non-focal  SKIN: No rashes or lesions    LABS                        13.9   10.03 )-----------( 147      ( 11 Jan 2020 10:40 )             41.4     01-11    132<L>  |  91<L>  |  19  ----------------------------<  213<H>  4.1   |  25  |  0.65  01-11    133<L>  |  92<L>  |  20  ----------------------------<  196<H>  4.3   |  25  |  0.64    Ca    8.8      11 Jan 2020 16:23  Ca    8.8      11 Jan 2020 15:00  Phos  2.4     01-11  Mg     1.7     01-11    TPro  8.8<H>  /  Alb  5.2<H>  /  TBili  0.6  /  DBili  x   /  AST  130<H>  /  ALT  101<H>  /  AlkPhos  98  01-11 01-11-20 @ 16:23 - VBG - pH: 7.51  | pCO2: 37    | pO2: 80    | Lactate: 2.7    01-11-20 @ 14:30 - VBG - pH: 7.48  | pCO2: 38    | pO2: 64    | Lactate: 3.6        RADIOLOGY & ADDITIONAL TESTS:    Imaging Personally Reviewed:    Consultant(s) Notes Reviewed:      Care Discussed with Consultants/Other Providers:

## 2020-01-12 NOTE — DISCHARGE NOTE PROVIDER - HOSPITAL COURSE
41 yo M with PMH of EtOH abuse/withdrawal in the past with previous admission in September 2019 with subsequent AMA p/w nausea/vomiting after binge drinking the past 2 weeks with last drink yesterday afternoon 1/10/20. Also noted to have transaminitis which downtrended. 43 yo M with PMH of EtOH abuse/withdrawal in the past with previous admission in September 2019 with subsequent AMA p/w nausea/vomiting after binge drinking the past 2 weeks with last drink yesterday afternoon 1/10/20. Also noted to have transaminitis which downtrended. Pt tolerated ativan taper well and clinically came back to his baseline. Pt is otherwise medically optimized for discharge back home.

## 2020-01-12 NOTE — DISCHARGE NOTE PROVIDER - NSFOLLOWUPCLINICS_GEN_ALL_ED_FT
[facility] General Internal Medicine  General Internal Medicine  [address] [id number]  Phone: [phone]  Fax:   Follow Up Time: 2 weeks

## 2020-01-12 NOTE — DISCHARGE NOTE PROVIDER - NSDCMRMEDTOKEN_GEN_ALL_CORE_FT
folic acid 1 mg oral tablet: 1 tab(s) orally once a day  Multiple Vitamins oral tablet: 1 tab(s) orally once a day  thiamine 100 mg oral tablet: 1 tab(s) orally once a day Ativan 1 mg oral tablet: Please take in the following order:   1/14: take 1 pill at 6pm and 10pm  1/15: take 1/2 pill every 4 hours for a total of 3 pills  1/16: take 1/2 pill in the morning and afternoon MDD:3 pills  folic acid 1 mg oral tablet: 1 tab(s) orally once a day  Multiple Vitamins oral tablet: 1 tab(s) orally once a day  thiamine 100 mg oral tablet: 1 tab(s) orally once a day

## 2020-01-12 NOTE — DISCHARGE NOTE PROVIDER - NSDCCPCAREPLAN_GEN_ALL_CORE_FT
PRINCIPAL DISCHARGE DIAGNOSIS  Diagnosis: Alcohol withdrawal  Assessment and Plan of Treatment: You came to the hospital because you were vomiting once you stopped drinking. We treated you for your alcohol withdrawal and you did very well.   Please see a primary care doctor in one week.      SECONDARY DISCHARGE DIAGNOSES  Diagnosis: Lactic acid acidosis  Assessment and Plan of Treatment:     Diagnosis: Alcoholic ketoacidosis  Assessment and Plan of Treatment: PRINCIPAL DISCHARGE DIAGNOSIS  Diagnosis: Alcohol withdrawal  Assessment and Plan of Treatment: You came to the hospital because you were vomiting once you stopped drinking. We treated you for your alcohol withdrawal with a medicaion called ativan and you did very well. Please finish taking your ativan dose when at home as directed.  Please see a primary care doctor in one week.      SECONDARY DISCHARGE DIAGNOSES  Diagnosis: Transaminitis  Assessment and Plan of Treatment: You were found to have elevated liver enzymes while in the hospital. This may ahve been due to your alcohol consumption. These levels came down when you stopped drinking in the hospital. Please follow-up with your primary care doctor regarding further management of your health.

## 2020-01-12 NOTE — CHART NOTE - NSCHARTNOTEFT_GEN_A_CORE
Called to speak to patient and family at bedside ~5pm 1/12. Wife was refusing potassium and ativan for the patient.     Wife at bedside, concerned that patient is lethargic and more tired than normal. Patient seen and examined at bedside. Patient pupils equal and reactive to light. Patient A&Ox3, and understands that he is in the hospital for alcoholic intoxication. Patient has mild tremors, but  no asterixis. Patient also has one episode of urinary incontinence but denies burning with urination, urinary frequency or urgency. Vitals signs are below     Vital Signs Last 24 Hrs  T(C): 36.9 (12 Jan 2020 18:00), Max: 37.3 (11 Jan 2020 20:43)  T(F): 98.4 (12 Jan 2020 18:00), Max: 99.1 (11 Jan 2020 20:43)  HR: 88 (12 Jan 2020 18:00) (81 - 111)  BP: 148/100 (12 Jan 2020 18:00) (139/85 - 167/97)  RR: 18 (12 Jan 2020 18:00) (17 - 18)  SpO2: 100% (12 Jan 2020 18:00) (98% - 100%)    -Explained to the patient and wife the importance of ativan and potassium(given K of 3.0). Both are in agreement with taking ativan, as long as patient is not sedated. Additionally, patient accepted potassium.   -Will follow up UA.     Norbert Ramon, PGY1

## 2020-01-13 LAB
ALBUMIN SERPL ELPH-MCNC: 3.6 G/DL — SIGNIFICANT CHANGE UP (ref 3.3–5)
ALP SERPL-CCNC: 76 U/L — SIGNIFICANT CHANGE UP (ref 40–120)
ALT FLD-CCNC: 82 U/L — HIGH (ref 4–41)
ANION GAP SERPL CALC-SCNC: 13 MMO/L — SIGNIFICANT CHANGE UP (ref 7–14)
AST SERPL-CCNC: 84 U/L — HIGH (ref 4–40)
BILIRUB SERPL-MCNC: 0.6 MG/DL — SIGNIFICANT CHANGE UP (ref 0.2–1.2)
BUN SERPL-MCNC: 6 MG/DL — LOW (ref 7–23)
CALCIUM SERPL-MCNC: 8.3 MG/DL — LOW (ref 8.4–10.5)
CHLORIDE SERPL-SCNC: 99 MMOL/L — SIGNIFICANT CHANGE UP (ref 98–107)
CO2 SERPL-SCNC: 24 MMOL/L — SIGNIFICANT CHANGE UP (ref 22–31)
CREAT SERPL-MCNC: 0.64 MG/DL — SIGNIFICANT CHANGE UP (ref 0.5–1.3)
GLUCOSE SERPL-MCNC: 132 MG/DL — HIGH (ref 70–99)
HCT VFR BLD CALC: 33.9 % — LOW (ref 39–50)
HGB BLD-MCNC: 11.4 G/DL — LOW (ref 13–17)
INR BLD: 0.97 — SIGNIFICANT CHANGE UP (ref 0.88–1.17)
MAGNESIUM SERPL-MCNC: 1.7 MG/DL — SIGNIFICANT CHANGE UP (ref 1.6–2.6)
MCHC RBC-ENTMCNC: 28.4 PG — SIGNIFICANT CHANGE UP (ref 27–34)
MCHC RBC-ENTMCNC: 33.6 % — SIGNIFICANT CHANGE UP (ref 32–36)
MCV RBC AUTO: 84.5 FL — SIGNIFICANT CHANGE UP (ref 80–100)
NRBC # FLD: 0 K/UL — SIGNIFICANT CHANGE UP (ref 0–0)
PHOSPHATE SERPL-MCNC: 3.7 MG/DL — SIGNIFICANT CHANGE UP (ref 2.5–4.5)
PLATELET # BLD AUTO: 81 K/UL — LOW (ref 150–400)
PMV BLD: 11.6 FL — SIGNIFICANT CHANGE UP (ref 7–13)
POTASSIUM SERPL-MCNC: 3 MMOL/L — LOW (ref 3.5–5.3)
POTASSIUM SERPL-SCNC: 3 MMOL/L — LOW (ref 3.5–5.3)
PROT SERPL-MCNC: 6.3 G/DL — SIGNIFICANT CHANGE UP (ref 6–8.3)
PROTHROM AB SERPL-ACNC: 11.1 SEC — SIGNIFICANT CHANGE UP (ref 9.8–13.1)
RBC # BLD: 4.01 M/UL — LOW (ref 4.2–5.8)
RBC # FLD: 14.2 % — SIGNIFICANT CHANGE UP (ref 10.3–14.5)
SODIUM SERPL-SCNC: 136 MMOL/L — SIGNIFICANT CHANGE UP (ref 135–145)
WBC # BLD: 4.03 K/UL — SIGNIFICANT CHANGE UP (ref 3.8–10.5)
WBC # FLD AUTO: 4.03 K/UL — SIGNIFICANT CHANGE UP (ref 3.8–10.5)

## 2020-01-13 PROCEDURE — 99233 SBSQ HOSP IP/OBS HIGH 50: CPT | Mod: GC

## 2020-01-13 RX ORDER — POTASSIUM CHLORIDE 20 MEQ
10 PACKET (EA) ORAL
Refills: 0 | Status: COMPLETED | OUTPATIENT
Start: 2020-01-13 | End: 2020-01-13

## 2020-01-13 RX ORDER — ACETAMINOPHEN 500 MG
650 TABLET ORAL EVERY 6 HOURS
Refills: 0 | Status: DISCONTINUED | OUTPATIENT
Start: 2020-01-13 | End: 2020-01-14

## 2020-01-13 RX ORDER — POTASSIUM CHLORIDE 20 MEQ
40 PACKET (EA) ORAL EVERY 4 HOURS
Refills: 0 | Status: COMPLETED | OUTPATIENT
Start: 2020-01-13 | End: 2020-01-13

## 2020-01-13 RX ADMIN — Medication 650 MILLIGRAM(S): at 13:40

## 2020-01-13 RX ADMIN — Medication 1 MILLIGRAM(S): at 12:38

## 2020-01-13 RX ADMIN — Medication 1.5 MILLIGRAM(S): at 14:33

## 2020-01-13 RX ADMIN — Medication 40 MILLIEQUIVALENT(S): at 14:34

## 2020-01-13 RX ADMIN — Medication 1.5 MILLIGRAM(S): at 18:23

## 2020-01-13 RX ADMIN — SODIUM CHLORIDE 125 MILLILITER(S): 9 INJECTION, SOLUTION INTRAVENOUS at 06:07

## 2020-01-13 RX ADMIN — Medication 100 MILLIGRAM(S): at 12:38

## 2020-01-13 RX ADMIN — Medication 40 MILLIEQUIVALENT(S): at 10:23

## 2020-01-13 RX ADMIN — Medication 1.5 MILLIGRAM(S): at 10:22

## 2020-01-13 RX ADMIN — Medication 650 MILLIGRAM(S): at 06:30

## 2020-01-13 RX ADMIN — Medication 100 MILLIEQUIVALENT(S): at 10:23

## 2020-01-13 RX ADMIN — Medication 650 MILLIGRAM(S): at 12:41

## 2020-01-13 RX ADMIN — Medication 100 MILLIEQUIVALENT(S): at 12:43

## 2020-01-13 RX ADMIN — Medication 2 MILLIGRAM(S): at 02:00

## 2020-01-13 RX ADMIN — Medication 2 MILLIGRAM(S): at 06:06

## 2020-01-13 RX ADMIN — Medication 650 MILLIGRAM(S): at 07:34

## 2020-01-13 RX ADMIN — Medication 1.5 MILLIGRAM(S): at 22:15

## 2020-01-13 RX ADMIN — Medication 650 MILLIGRAM(S): at 23:17

## 2020-01-13 NOTE — PROGRESS NOTE ADULT - SUBJECTIVE AND OBJECTIVE BOX
Patient is a 44y old  Male who presents with a chief complaint of Alcohol Withdrawal (2020 11:35)      SUBJECTIVE / OVERNIGHT EVENTS:        MEDICATIONS  (STANDING):  dextrose 5% + sodium chloride 0.9%. 1000 milliLiter(s) (125 mL/Hr) IV Continuous <Continuous>  folic acid 1 milliGRAM(s) Oral daily  influenza   Vaccine 0.5 milliLiter(s) IntraMuscular once  LORazepam   Injectable 1.5 milliGRAM(s) IV Push every 4 hours  LORazepam   Injectable   IV Push   thiamine 100 milliGRAM(s) Oral daily    MEDICATIONS  (PRN):  acetaminophen   Tablet .. 650 milliGRAM(s) Oral every 6 hours PRN Mild Pain (1 - 3), Moderate Pain (4 - 6)  LORazepam     Tablet 2 milliGRAM(s) Oral every 2 hours PRN Symptom-triggered 2 point increase in CIWA-Ar  LORazepam   Injectable 2 milliGRAM(s) IV Push every 1 hour PRN Symptom-triggered: each CIWA -Ar score 8 or GREATER  ondansetron Injectable 4 milliGRAM(s) IV Push every 8 hours PRN Nausea and/or Vomiting      Vital Signs Last 24 Hrs  T(C): 36.9 (2020 06:00), Max: 36.9 (2020 18:00)  T(F): 98.4 (2020 06:00), Max: 98.4 (2020 18:00)  HR: 74 (2020 06:00) (74 - 88)  BP: 135/95 (2020 06:00) (135/95 - 153/98)  BP(mean): --  RR: 18 (2020 06:00) (17 - 18)  SpO2: 100% (2020 06:00) (98% - 100%)  CAPILLARY BLOOD GLUCOSE        I&O's Summary      PHYSICAL EXAM:  GENERAL: NAD, well-developed  HEAD:  Atraumatic, Normocephalic  EYES: EOMI, PERRLA, conjunctiva and sclera clear  NECK: Supple, No JVD  CHEST/LUNG: Clear to auscultation bilaterally; No wheeze  HEART: Regular rate and rhythm; No murmurs, rubs, or gallops  ABDOMEN: Soft, Nontender, Nondistended; Bowel sounds present  EXTREMITIES:  2+ Peripheral Pulses, No clubbing, cyanosis, or edema  PSYCH: AAOx3  NEUROLOGY: non-focal  SKIN: No rashes or lesions    LABS:                        11.5   3.85  )-----------( 93       ( 2020 07:36 )             35.7         135  |  96<L>  |  13  ----------------------------<  130<H>  3.0<L>   |  25  |  0.67    Ca    8.9      2020 07:36  Phos  3.1       Mg     1.9         TPro  6.6  /  Alb  3.9  /  TBili  1.0  /  DBili  x   /  AST  78<H>  /  ALT  66<H>  /  AlkPhos  75            Urinalysis Basic - ( 2020 20:30 )    Color: COLORLESS / Appearance: CLEAR / S.004 / pH: 7.5  Gluc: NEGATIVE / Ketone: NEGATIVE  / Bili: NEGATIVE / Urobili: NORMAL   Blood: NEGATIVE / Protein: NEGATIVE / Nitrite: NEGATIVE   Leuk Esterase: NEGATIVE / RBC: x / WBC x   Sq Epi: x / Non Sq Epi: x / Bacteria: x        RADIOLOGY & ADDITIONAL TESTS:    Imaging Personally Reviewed:    Consultant(s) Notes Reviewed:      Care Discussed with Consultants/Other Providers: Patient is a 44y old  Male who presents with a chief complaint of Alcohol Withdrawal (2020 11:35)      SUBJECTIVE / OVERNIGHT EVENTS:    Pt resting comfortably in bed, reports increased weakness, unable to control urination, unable to get up from bed and continues to have shakes, worse from previous times when he was admitted for alc withdrawal. Denies fevers, chills, chest p/p, sob, v/d.     MEDICATIONS  (STANDING):  dextrose 5% + sodium chloride 0.9%. 1000 milliLiter(s) (125 mL/Hr) IV Continuous <Continuous>  folic acid 1 milliGRAM(s) Oral daily  influenza   Vaccine 0.5 milliLiter(s) IntraMuscular once  LORazepam   Injectable 1.5 milliGRAM(s) IV Push every 4 hours  LORazepam   Injectable   IV Push   thiamine 100 milliGRAM(s) Oral daily    MEDICATIONS  (PRN):  acetaminophen   Tablet .. 650 milliGRAM(s) Oral every 6 hours PRN Mild Pain (1 - 3), Moderate Pain (4 - 6)  LORazepam     Tablet 2 milliGRAM(s) Oral every 2 hours PRN Symptom-triggered 2 point increase in CIWA-Ar  LORazepam   Injectable 2 milliGRAM(s) IV Push every 1 hour PRN Symptom-triggered: each CIWA -Ar score 8 or GREATER  ondansetron Injectable 4 milliGRAM(s) IV Push every 8 hours PRN Nausea and/or Vomiting      Vital Signs Last 24 Hrs  T(C): 36.9 (2020 06:00), Max: 36.9 (2020 18:00)  T(F): 98.4 (2020 06:00), Max: 98.4 (2020 18:00)  HR: 74 (2020 06:00) (74 - 88)  BP: 135/95 (2020 06:00) (135/95 - 153/98)  BP(mean): --  RR: 18 (2020 06:00) (17 - 18)  SpO2: 100% (2020 06:00) (98% - 100%)  CAPILLARY BLOOD GLUCOSE        I&O's Summary      PHYSICAL EXAM:  GENERAL: NAD, well-developed  HEAD:  Atraumatic, Normocephalic  EYES: EOMI, PERRLA, conjunctiva and sclera clear  NECK: Supple, No JVD  CHEST/LUNG: Clear to auscultation bilaterally; No wheeze  HEART: Regular rate and rhythm; No murmurs, rubs, or gallops  ABDOMEN: Soft, Nontender, Nondistended; Bowel sounds present  EXTREMITIES:  2+ Peripheral Pulses, No clubbing, cyanosis, or edema  PSYCH: AAOx3  NEUROLOGY: non-focal  SKIN: No rashes or lesions    LABS:                        11.5   3.85  )-----------( 93       ( 2020 07:36 )             35.7     12    135  |  96<L>  |  13  ----------------------------<  130<H>  3.0<L>   |  25  |  0.67    Ca    8.9      2020 07:36  Phos  3.1       Mg     1.9         TPro  6.6  /  Alb  3.9  /  TBili  1.0  /  DBili  x   /  AST  78<H>  /  ALT  66<H>  /  AlkPhos  75            Urinalysis Basic - ( 2020 20:30 )    Color: COLORLESS / Appearance: CLEAR / S.004 / pH: 7.5  Gluc: NEGATIVE / Ketone: NEGATIVE  / Bili: NEGATIVE / Urobili: NORMAL   Blood: NEGATIVE / Protein: NEGATIVE / Nitrite: NEGATIVE   Leuk Esterase: NEGATIVE / RBC: x / WBC x   Sq Epi: x / Non Sq Epi: x / Bacteria: x        RADIOLOGY & ADDITIONAL TESTS:    Imaging Personally Reviewed:    Consultant(s) Notes Reviewed:      Care Discussed with Consultants/Other Providers:

## 2020-01-13 NOTE — PROGRESS NOTE ADULT - ASSESSMENT
41 yo M with PMH of EtOH abuse/withdrawal in the past with previous admission in September 2019 with subsequent AMA p/w nausea/vomiting after binge drinking the past 2 weeks with last drink yesterday afternoon 1/10/20.

## 2020-01-14 VITALS
OXYGEN SATURATION: 100 % | RESPIRATION RATE: 16 BRPM | SYSTOLIC BLOOD PRESSURE: 145 MMHG | TEMPERATURE: 98 F | HEART RATE: 99 BPM | DIASTOLIC BLOOD PRESSURE: 105 MMHG

## 2020-01-14 LAB
ALBUMIN SERPL ELPH-MCNC: 3.6 G/DL — SIGNIFICANT CHANGE UP (ref 3.3–5)
ALP SERPL-CCNC: 92 U/L — SIGNIFICANT CHANGE UP (ref 40–120)
ALT FLD-CCNC: 90 U/L — HIGH (ref 4–41)
ANION GAP SERPL CALC-SCNC: 8 MMO/L — SIGNIFICANT CHANGE UP (ref 7–14)
AST SERPL-CCNC: 66 U/L — HIGH (ref 4–40)
BILIRUB SERPL-MCNC: 0.4 MG/DL — SIGNIFICANT CHANGE UP (ref 0.2–1.2)
BUN SERPL-MCNC: 8 MG/DL — SIGNIFICANT CHANGE UP (ref 7–23)
CALCIUM SERPL-MCNC: 8.7 MG/DL — SIGNIFICANT CHANGE UP (ref 8.4–10.5)
CHLORIDE SERPL-SCNC: 99 MMOL/L — SIGNIFICANT CHANGE UP (ref 98–107)
CO2 SERPL-SCNC: 24 MMOL/L — SIGNIFICANT CHANGE UP (ref 22–31)
CREAT SERPL-MCNC: 0.68 MG/DL — SIGNIFICANT CHANGE UP (ref 0.5–1.3)
GLUCOSE SERPL-MCNC: 108 MG/DL — HIGH (ref 70–99)
HCT VFR BLD CALC: 35.2 % — LOW (ref 39–50)
HGB BLD-MCNC: 11.5 G/DL — LOW (ref 13–17)
MAGNESIUM SERPL-MCNC: 1.6 MG/DL — SIGNIFICANT CHANGE UP (ref 1.6–2.6)
MCHC RBC-ENTMCNC: 27.6 PG — SIGNIFICANT CHANGE UP (ref 27–34)
MCHC RBC-ENTMCNC: 32.7 % — SIGNIFICANT CHANGE UP (ref 32–36)
MCV RBC AUTO: 84.6 FL — SIGNIFICANT CHANGE UP (ref 80–100)
NRBC # FLD: 0 K/UL — SIGNIFICANT CHANGE UP (ref 0–0)
PHOSPHATE SERPL-MCNC: 4.1 MG/DL — SIGNIFICANT CHANGE UP (ref 2.5–4.5)
PLATELET # BLD AUTO: 95 K/UL — LOW (ref 150–400)
PMV BLD: 11.7 FL — SIGNIFICANT CHANGE UP (ref 7–13)
POTASSIUM SERPL-MCNC: 3.2 MMOL/L — LOW (ref 3.5–5.3)
POTASSIUM SERPL-SCNC: 3.2 MMOL/L — LOW (ref 3.5–5.3)
PROT SERPL-MCNC: 6.4 G/DL — SIGNIFICANT CHANGE UP (ref 6–8.3)
RBC # BLD: 4.16 M/UL — LOW (ref 4.2–5.8)
RBC # FLD: 14 % — SIGNIFICANT CHANGE UP (ref 10.3–14.5)
SODIUM SERPL-SCNC: 131 MMOL/L — LOW (ref 135–145)
WBC # BLD: 4.96 K/UL — SIGNIFICANT CHANGE UP (ref 3.8–10.5)
WBC # FLD AUTO: 4.96 K/UL — SIGNIFICANT CHANGE UP (ref 3.8–10.5)

## 2020-01-14 PROCEDURE — 99239 HOSP IP/OBS DSCHRG MGMT >30: CPT | Mod: GC

## 2020-01-14 RX ORDER — MAGNESIUM SULFATE 500 MG/ML
2 VIAL (ML) INJECTION ONCE
Refills: 0 | Status: COMPLETED | OUTPATIENT
Start: 2020-01-14 | End: 2020-01-14

## 2020-01-14 RX ORDER — POTASSIUM CHLORIDE 20 MEQ
40 PACKET (EA) ORAL EVERY 4 HOURS
Refills: 0 | Status: COMPLETED | OUTPATIENT
Start: 2020-01-14 | End: 2020-01-14

## 2020-01-14 RX ADMIN — Medication 40 MILLIEQUIVALENT(S): at 09:38

## 2020-01-14 RX ADMIN — Medication 40 MILLIEQUIVALENT(S): at 13:07

## 2020-01-14 RX ADMIN — Medication 650 MILLIGRAM(S): at 00:17

## 2020-01-14 RX ADMIN — Medication 1 MILLIGRAM(S): at 09:38

## 2020-01-14 RX ADMIN — Medication 100 MILLIGRAM(S): at 11:28

## 2020-01-14 RX ADMIN — Medication 1.5 MILLIGRAM(S): at 06:25

## 2020-01-14 RX ADMIN — Medication 1 MILLIGRAM(S): at 17:03

## 2020-01-14 RX ADMIN — Medication 1.5 MILLIGRAM(S): at 02:18

## 2020-01-14 RX ADMIN — Medication 1 MILLIGRAM(S): at 13:07

## 2020-01-14 RX ADMIN — SODIUM CHLORIDE 125 MILLILITER(S): 9 INJECTION, SOLUTION INTRAVENOUS at 06:25

## 2020-01-14 RX ADMIN — Medication 50 GRAM(S): at 11:27

## 2020-01-14 RX ADMIN — Medication 1 MILLIGRAM(S): at 11:28

## 2020-01-14 NOTE — PROVIDER CONTACT NOTE (OTHER) - BACKGROUND
Patient admitted due to alcohol abuse/withdrawal, patient scoring 0 on CIWA assessment. Patient has PMHx of transaminitis.

## 2020-01-14 NOTE — PROGRESS NOTE ADULT - ATTENDING COMMENTS
35 minutes on discharge plan of care including counseling to family regarding etoh cessation    PT to complete taper as o/p and SW to provide o/p rehab options
pt seen and examined, appears very lethargic  wife at bedside, expressing concern  likely lasting effects of valium  will decrease ativan to 2mg, hold for sedation   continue to monitor ciwa scores
Pt reports difficulty with urge incontinence  Afebrile  AAOx3, coordination intact, strength intact, no asterixsis  K 3.0  #Etoh Withdrawal: continue CIWA and ativan taper, seen by SBIRT, needs close o/p follow up for etoh rehab  #Transaminitis: LFTS trending down, likely secondary to etoh  #Hypokalemia: replete K, repeat BMP  #thrombocyopenia: likely secondary to etoh abuse

## 2020-01-14 NOTE — DISCHARGE NOTE NURSING/CASE MANAGEMENT/SOCIAL WORK - PATIENT PORTAL LINK FT
You can access the FollowMyHealth Patient Portal offered by [facility] by registering at the following website: [email or web address]. By joining [facility]’s FollowMyHealth portal, you will also be able to view your health information using other applications (apps) compatible with our system.

## 2020-01-14 NOTE — PROGRESS NOTE ADULT - SUBJECTIVE AND OBJECTIVE BOX
Patient is a 44y old  Male who presents with a chief complaint of Alcohol Withdrawal (2020 06:37)      SUBJECTIVE / OVERNIGHT EVENTS:    MEDICATIONS  (STANDING):  dextrose 5% + sodium chloride 0.9%. 1000 milliLiter(s) (125 mL/Hr) IV Continuous <Continuous>  folic acid 1 milliGRAM(s) Oral daily  influenza   Vaccine 0.5 milliLiter(s) IntraMuscular once  LORazepam   Injectable 1 milliGRAM(s) IV Push every 4 hours  LORazepam   Injectable   IV Push   thiamine 100 milliGRAM(s) Oral daily    MEDICATIONS  (PRN):  acetaminophen   Tablet .. 650 milliGRAM(s) Oral every 6 hours PRN Mild Pain (1 - 3), Moderate Pain (4 - 6)  LORazepam     Tablet 2 milliGRAM(s) Oral every 2 hours PRN Symptom-triggered 2 point increase in CIWA-Ar  LORazepam   Injectable 2 milliGRAM(s) IV Push every 1 hour PRN Symptom-triggered: each CIWA -Ar score 8 or GREATER  ondansetron Injectable 4 milliGRAM(s) IV Push every 8 hours PRN Nausea and/or Vomiting      Vital Signs Last 24 Hrs  T(C): 36.3 (2020 06:00), Max: 37.2 (2020 22:00)  T(F): 97.4 (2020 06:00), Max: 98.9 (2020 22:00)  HR: 74 (2020 06:00) (74 - 99)  BP: 139/99 (2020 06:00) (120/78 - 144/98)  BP(mean): --  RR: 16 (2020 06:00) (16 - 18)  SpO2: 100% (2020 06:00) (99% - 100%)  CAPILLARY BLOOD GLUCOSE        I&O's Summary      PHYSICAL EXAM:  GENERAL: NAD, well-developed, tired appearing  HEAD:  Atraumatic, Normocephalic  EYES: EOMI, PERRLA, conjunctiva and sclera clear  NECK: Supple, No JVD  CHEST/LUNG: Clear to auscultation bilaterally; No wheeze  HEART: Regular rate and rhythm; No murmurs, rubs, or gallops  ABDOMEN: Soft, Nontender, Nondistended; Bowel sounds present  EXTREMITIES:  2+ Peripheral Pulses, No clubbing, cyanosis, or edema  PSYCH: AAOx3  NEUROLOGY: non-focal  SKIN: No rashes or lesions    LABS:                        11.4   4.03  )-----------( 81       ( 2020 07:30 )             33.9         136  |  99  |  6<L>  ----------------------------<  132<H>  3.0<L>   |  24  |  0.64    Ca    8.3<L>      2020 07:30  Phos  3.7       Mg     1.7         TPro  6.3  /  Alb  3.6  /  TBili  0.6  /  DBili  x   /  AST  84<H>  /  ALT  82<H>  /  AlkPhos  76      PT/INR - ( 2020 07:30 )   PT: 11.1 SEC;   INR: 0.97                Urinalysis Basic - ( 2020 20:30 )    Color: COLORLESS / Appearance: CLEAR / S.004 / pH: 7.5  Gluc: NEGATIVE / Ketone: NEGATIVE  / Bili: NEGATIVE / Urobili: NORMAL   Blood: NEGATIVE / Protein: NEGATIVE / Nitrite: NEGATIVE   Leuk Esterase: NEGATIVE / RBC: x / WBC x   Sq Epi: x / Non Sq Epi: x / Bacteria: x        RADIOLOGY & ADDITIONAL TESTS:    Imaging Personally Reviewed:    Consultant(s) Notes Reviewed:      Care Discussed with Consultants/Other Providers: Patient is a 44y old  Male who presents with a chief complaint of Alcohol Withdrawal (2020 06:37)      SUBJECTIVE / OVERNIGHT EVENTS:    Pt reports improvement in ambulation and urination last night, denies fevers, chills, chest p/p, sob, n/v/d.     MEDICATIONS  (STANDING):  dextrose 5% + sodium chloride 0.9%. 1000 milliLiter(s) (125 mL/Hr) IV Continuous <Continuous>  folic acid 1 milliGRAM(s) Oral daily  influenza   Vaccine 0.5 milliLiter(s) IntraMuscular once  LORazepam   Injectable 1 milliGRAM(s) IV Push every 4 hours  LORazepam   Injectable   IV Push   thiamine 100 milliGRAM(s) Oral daily    MEDICATIONS  (PRN):  acetaminophen   Tablet .. 650 milliGRAM(s) Oral every 6 hours PRN Mild Pain (1 - 3), Moderate Pain (4 - 6)  LORazepam     Tablet 2 milliGRAM(s) Oral every 2 hours PRN Symptom-triggered 2 point increase in CIWA-Ar  LORazepam   Injectable 2 milliGRAM(s) IV Push every 1 hour PRN Symptom-triggered: each CIWA -Ar score 8 or GREATER  ondansetron Injectable 4 milliGRAM(s) IV Push every 8 hours PRN Nausea and/or Vomiting      Vital Signs Last 24 Hrs  T(C): 36.3 (2020 06:00), Max: 37.2 (2020 22:00)  T(F): 97.4 (2020 06:00), Max: 98.9 (2020 22:00)  HR: 74 (2020 06:00) (74 - 99)  BP: 139/99 (2020 06:00) (120/78 - 144/98)  BP(mean): --  RR: 16 (2020 06:00) (16 - 18)  SpO2: 100% (2020 06:00) (99% - 100%)  CAPILLARY BLOOD GLUCOSE        I&O's Summary      PHYSICAL EXAM:  GENERAL: NAD, well-developed, tired appearing  HEAD:  Atraumatic, Normocephalic  EYES: EOMI, PERRLA, conjunctiva and sclera clear  NECK: Supple, No JVD  CHEST/LUNG: Clear to auscultation bilaterally; No wheeze  HEART: Regular rate and rhythm; No murmurs, rubs, or gallops  ABDOMEN: Soft, Nontender, Nondistended; Bowel sounds present  EXTREMITIES:  2+ Peripheral Pulses, No clubbing, cyanosis, or edema  PSYCH: AAOx3  NEUROLOGY: non-focal  SKIN: No rashes or lesions    LABS:                        11.4   4.03  )-----------( 81       ( 2020 07:30 )             33.9     -    136  |  99  |  6<L>  ----------------------------<  132<H>  3.0<L>   |  24  |  0.64    Ca    8.3<L>      2020 07:30  Phos  3.7       Mg     1.7         TPro  6.3  /  Alb  3.6  /  TBili  0.6  /  DBili  x   /  AST  84<H>  /  ALT  82<H>  /  AlkPhos  76  -13    PT/INR - ( 2020 07:30 )   PT: 11.1 SEC;   INR: 0.97       Urinalysis Basic - ( 2020 20:30 )    Color: COLORLESS / Appearance: CLEAR / S.004 / pH: 7.5  Gluc: NEGATIVE / Ketone: NEGATIVE  / Bili: NEGATIVE / Urobili: NORMAL   Blood: NEGATIVE / Protein: NEGATIVE / Nitrite: NEGATIVE   Leuk Esterase: NEGATIVE / RBC: x / WBC x   Sq Epi: x / Non Sq Epi: x / Bacteria: x        RADIOLOGY & ADDITIONAL TESTS:    Imaging Personally Reviewed:    Consultant(s) Notes Reviewed:      Care Discussed with Consultants/Other Providers:

## 2020-02-24 NOTE — ED ADULT NURSE NOTE - DOES PATIENT HAVE ADVANCE DIRECTIVE
No Spironolactone Pregnancy And Lactation Text: This medication can cause feminization of the male fetus and should be avoided during pregnancy. The active metabolite is also found in breast milk.

## 2020-10-06 NOTE — ED PROVIDER NOTE - CPE EDP NEURO NORM
Patient:   MARCIA MCKINNON            MRN: [id number]            FIN: [id number]              Age:   64 years     Sex:  FEMALE     :  56   Associated Diagnoses:   None   Author:   TEOFILO DWYER     Chief Complaint   64F w/ PMHx including vertigo, HTN, HLD, glaucoma, presents to the ER for concerns of syncope     History of Present Illness             The patient presents with 64F w/ PMHx including vertigo, HTN, HLD, glaucoma, presents to the ER for concerns of syncope.  Symptoms started today around 3:30am.  Woke up with a sore throat, also was feeling dizziness.  Said she has a vertigo hx, and had hospitalization in May for vertigo in which she was admitted for a couple days and started on meclizine which she takes PRN.  She took a meclizine and took teto seltzer cold/sinus.   Slept some more.  Woke up again a little later.  Called urgent care and they told her that she could try a walk in visit there today.  She went to work, and planned on going to urgent care later.  At work she was getting up to get ready to try to go to urgent care, she had to hold on to her coworker to brace herself.  Was feeling dizzy, thinks she passed out.  Doesn't really remember what happened. Next thing she knew, EMS was there and she was  being brought to the ER.  Never went to urgent care because she ended up here.  Also said she has had some HA associated.  Her grand-daughter was dx w/ strep throat on Saturday, and has been around her.  Also feeling generally unwell, generalized weakness.  Has had some nausea.  No abd pain/ vomiting/ diarrhea.  Decreased appetite.  Has had some coughing and congested feeling.  Feels like there is mucous, but not coughing anything up.  No SOB  or wheezes.  Has had some midsternal CP which feels like a pressure intermitently, sometimes goes into her neck.  No swelling.  Some chills.  Denies chills.  No vision changes/ numbness/ tingling.  No palpitations.  No dysuria/ hematuria/  increased urinary frequency or urgency.  Denies any other symptoms..       Review of Systems   Constitutional:  Chills, Weakness, No fever.    Eye:  Negative.    Ear/Nose/Mouth/Throat:  Sore throat.    Cardiovascular:  Syncope, No palpitations, No peripheral edema.         Chest pain: Midsternal.    Respiratory:  Cough, congestion, No shortness of breath, No wheezing.   Gastrointestinal:  Nausea, No vomiting, No diarrhea, No abdominal pain.   Genitourinary:  No dysuria, No hematuria, No urinary frequency, No urinary urgency.   Musculoskeletal:  Negative.    Integumentary:  Negative.    Hematology/Lymphatics:  Negative.    Neurologic:  Headache, dizziness, No numbness, No tingling.    Endocrine:  Negative.    Allergy/Immunologic:  Negative.    Psychiatric:  Negative.    All other systems All other systems are negative.     Histories   Past Med History: Past Medical History   Anemia  Arthritis  Chronic pain  GERD - Gastro-esophageal reflux disease  Glaucoma  Hypertension  Hypothyroid  Obesity  Risk factors for obstructive sleep apnea    Family History:    CA - Cancer of ovary  MOTHER  Congestive heart failure  GRANDMOTHER  ,   MOTHER: CA - Cancer of ovary  FATHER: No known hx heart disease/ HTN/ HLD/ or DM  GRANDMOTHER: Congestive heart failure  GRANDFATHER: rheumatic fever    Procedure History:    Left Catarct Surgery.  Thyroidectomy (SNOMED CT 87101675).  Tubal Ligation.  Massage of body region (SNOMED CT 197357750)., No qualifying data available.     Social History       Alcohol  Details: occasional/infrequent  Substance Abuse  Details: occasional marijuana.  No other illicit hx.  Tobacco  Details: Never smoker.  .       Health Status   Allergies:    Allergic Reactions (All)  NKA, Allergies (ST)   Allergies (1) Active Reaction  NKA None Documented    Current medications:  (Selected)   Prescriptions  Prescribed  Levaquin 500 mg oral tablet: 500 mg = 1 tab, Oral, Q24H, Tab, # 5 tab, 0 Refills, Maintenance, Pharmacy:  [id number]  amLODIPine oral 5 mg tablet: 5 mg = 1 tab, Oral, Daily, Tab, # 30 tab, 0 Refills, Maintenance, Pharmacy: [id number]  Documented Medications  Documented  Diovan HCT oral 80-12.5 mg tablet: = 1 tab, Oral, Daily, Tab, Maintenance  Fergon 240 mg (27 mg elemental iron) oral tablet: 240 mg = 1 tab, Oral, Daily, Maintenance  albuterol HFA inhaler oral 90 mcg/puff: 180 mcg = 2 puff, MDI/DPI, Q6H, PRN shortness of breath, Oral Inhalant, Maintenance  aspirin oral 81 mg DR tablet (Ecotrin Low Strength): 81 mg = 1 tab, Oral, Daily, Tab DR, Maintenance,   No qualifying data available  , No qualifying data available        Physical Examination   VS/Measurements     Vitals between:   05-OCT-2020 19:58:45   TO   06-OCT-2020 19:58:45                   LAST RESULT MINIMUM MAXIMUM  Temperature 36.6 36.6 36.6  Heart Rate 70 63 82  Respiratory Rate 16 15 19  NISBP           176 169 188  NIDBP           87 82 98  NIMBP           112 106 124  SpO2                    96 95 98    General:  Alert and oriented, No acute distress, A&Ox4.    Eye:  Pupils are equal, round and reactive to light, Extraocular movements are intact.   HENT:  Normocephalic, dry oral mucosa, mild pharyngeal erythema noted, post nasal drip noted.   Neck:  Supple, Non-tender.    Respiratory:  Lungs are clear to auscultation, Respirations are non-labored, Breath sounds are equal, Symmetrical chest wall expansion.   Cardiovascular:  Normal rate, Regular rhythm, No edema.         Arterial pulses: Bilateral, Radial, Dorsalis pedis, 2+.         Capillary refill: Bilateral, Upper extremity, Lower extremity, Less than 2 seconds.   Gastrointestinal:  Soft, Non-distended.         Tenderness: Negative.         Guarding: Negative.         Rebound: Negative.         Bowel sounds: Normal.    Genitourinary:  No costovertebral angle tenderness.    Lymphatics:  No lymphadenopathy neck, axilla, groin.    Musculoskeletal:  Normal range of motion, Normal  strength, No tenderness, No swelling.   Integumentary:  Warm, Dry, Pink, no cyanosis.    Neurologic:  Alert, Oriented, No focal deficits, Cranial Nerves II-XII are grossly intact.   Cognition and Speech:  Oriented, Speech clear and coherent.    Psychiatric:  Cooperative, Appropriate mood & affect.      Review / Management   Laboratory results:     Labs between:  05-OCT-2020 17:44 to 06-OCT-2020 17:44  CBC:                 WBC  HgB  Hct  Plt  MCV  RDW   06-OCT-2020 8.5  13.9  42.9  166  93.9  13.2   DIFF:                 Seg  Neutroph//ABS  Lymph//ABS  Mono//ABS  EOS/ABS  06-OCT-2020 NOT APPLICABLE  68 // 5.8 21 // 1.8 8 // 0.6 2 // 0.2  BMP:                 Na  Cl  BUN  Glu   06-OCT-2020 144  106  16  83                              K  CO2  Cr  Ca                              (L) 3.2  28  (H) 1.09  9.8   Other Chem:             Mg  Phos  Triglycerides  GGTP  DirectBili                           2.1                          ,   Labs between:  05-OCT-2020 17:44 to 06-OCT-2020 17:44  CBC:                 WBC  HgB  Hct  Plt  MCV  RDW   06-OCT-2020 8.5  13.9  42.9  166  93.9  13.2   DIFF:                 Seg  Neutroph//ABS  Lymph//ABS  Mono//ABS  EOS/ABS  06-OCT-2020 NOT APPLICABLE  68 // 5.8 21 // 1.8 8 // 0.6 2 // 0.2  BMP:                 Na  Cl  BUN  Glu   06-OCT-2020 144  106  16  83                              K  CO2  Cr  Ca                              (L) 3.2  28  (H) 1.09  9.8   Other Chem:             Mg  Phos  Triglycerides  GGTP  DirectBili                           2.1                          No qualifying data available   .    Radiology results   Result title:  CT HEAD OR BRAIN WO CON  Result status:  Final  Verified by:  DAVID SKINNER on 10/06/2020 13:02  IMPRESSION: No definitive acute intracranial findings.  Recommend follow-up as clinically warranted.Report is provided at time of study.    Result title:  XR CHEST 2V  Result status:  Final  Verified by:  DAVID SKINNER on 10/06/2020 12:49  IMPRESSION:  Accounting for technical differences, relatively stable cardiopulmonary findings compared to 11/22/2019 study.  Recommend followup as clinically warranted.       Impression and Plan   Dx and Plan:  Diagnosis     64F w/ PMHx including vertigo, HTN, HLD, glaucoma, presents to the ER for concerns of syncope  Possible Syncope; Dizziness  Hx of vertigo  -Reports dizziness and possible episode of syncope earlier today.  Although pt not quite sure what happened and unsure if she actually passed out.  -per pt, past hx vertigo which she takes meclizine PRN for.  -CTH w/ nothing acute  -UA negative, orthostatic negative in ER  -TSH, check echo, neurochecks, orthostatics, tele, fall precautions  -Cardiology consulted, appreciate recommendations  -consider adding neurology consult  Atypical Chest Pain; Abnormal EKG  -trop negative  -EKG w/ T wave inversions in inferior leads  -CXR w/ borderline cardiomegaly appearing relatively stable, nothing acute  -trend trop, check echo, monitor on tele  -consult cardiology, appreciate recommendations  Hypertensive urgency  Hypertension w/ CKD2  -Having some SBPs in 180s on bedside monitor in ER  -PRN hydralazine ordered.  Plan to continue home medications and monitor.  HEBER on CKD2  -Renal function reviewed,   -IVF ordered  -renally dose, avoid nephrotoxins, repeat CMP  Hypokalemia  -K+ 3.2, repletion given, repeat CMP  Hyperlipidemia  -home statin  Possible viral URI; Rule out covid19  -CXR w/ borderline cardiomegaly appearing relatively stable.  Pulm vasculature w/in normal limits.  Probable subsegmental atelectasis/scarring in b/l lung bases appears relatively.  Remainder of lungs appear clear of pneumonic infiltrate.  No effusion.  No acute findings.  -procal negative, doubt pneumonia  -rapid strep test negative  -CRP, CPK, ferritin, LDH, covid test  DVT PPX--lovenox sw  Full code for now, confirm code status tomorrow  I certify Inpatient admission with expected length of stay of over 2  midnights due to medically necessary hospital care for: the above medical issues..    .   - - -

## 2020-12-20 ENCOUNTER — EMERGENCY (EMERGENCY)
Facility: HOSPITAL | Age: 45
LOS: 1 days | Discharge: HOME | End: 2020-12-20
Attending: EMERGENCY MEDICINE | Admitting: EMERGENCY MEDICINE
Payer: MEDICAID

## 2020-12-20 VITALS
HEIGHT: 71 IN | HEART RATE: 110 BPM | DIASTOLIC BLOOD PRESSURE: 95 MMHG | OXYGEN SATURATION: 100 % | RESPIRATION RATE: 18 BRPM | TEMPERATURE: 100 F | SYSTOLIC BLOOD PRESSURE: 141 MMHG

## 2020-12-20 VITALS
DIASTOLIC BLOOD PRESSURE: 97 MMHG | OXYGEN SATURATION: 100 % | SYSTOLIC BLOOD PRESSURE: 140 MMHG | HEART RATE: 97 BPM | RESPIRATION RATE: 16 BRPM

## 2020-12-20 LAB
ALBUMIN SERPL ELPH-MCNC: 5.1 G/DL — HIGH (ref 3.3–5)
ALP SERPL-CCNC: 89 U/L — SIGNIFICANT CHANGE UP (ref 40–120)
ALT FLD-CCNC: 43 U/L — HIGH (ref 4–41)
ANION GAP SERPL CALC-SCNC: 17 MMOL/L — HIGH (ref 7–14)
APTT BLD: 27 SEC — SIGNIFICANT CHANGE UP (ref 27–36.3)
AST SERPL-CCNC: 41 U/L — HIGH (ref 4–40)
BASOPHILS # BLD AUTO: 0.01 K/UL — SIGNIFICANT CHANGE UP (ref 0–0.2)
BASOPHILS NFR BLD AUTO: 0.1 % — SIGNIFICANT CHANGE UP (ref 0–2)
BILIRUB SERPL-MCNC: 0.9 MG/DL — SIGNIFICANT CHANGE UP (ref 0.2–1.2)
BUN SERPL-MCNC: 29 MG/DL — HIGH (ref 7–23)
CALCIUM SERPL-MCNC: 10.3 MG/DL — SIGNIFICANT CHANGE UP (ref 8.4–10.5)
CHLORIDE SERPL-SCNC: 88 MMOL/L — LOW (ref 98–107)
CO2 SERPL-SCNC: 30 MMOL/L — SIGNIFICANT CHANGE UP (ref 22–31)
CREAT SERPL-MCNC: 1.04 MG/DL — SIGNIFICANT CHANGE UP (ref 0.5–1.3)
EOSINOPHIL # BLD AUTO: 0.03 K/UL — SIGNIFICANT CHANGE UP (ref 0–0.5)
EOSINOPHIL NFR BLD AUTO: 0.4 % — SIGNIFICANT CHANGE UP (ref 0–6)
GLUCOSE SERPL-MCNC: 166 MG/DL — HIGH (ref 70–99)
HCT VFR BLD CALC: 43.6 % — SIGNIFICANT CHANGE UP (ref 39–50)
HGB BLD-MCNC: 14.7 G/DL — SIGNIFICANT CHANGE UP (ref 13–17)
IANC: 7.41 K/UL — SIGNIFICANT CHANGE UP (ref 1.5–8.5)
IMM GRANULOCYTES NFR BLD AUTO: 0.2 % — SIGNIFICANT CHANGE UP (ref 0–1.5)
INR BLD: 0.95 RATIO — SIGNIFICANT CHANGE UP (ref 0.88–1.17)
LIDOCAIN IGE QN: 22 U/L — SIGNIFICANT CHANGE UP (ref 7–60)
LYMPHOCYTES # BLD AUTO: 0.59 K/UL — LOW (ref 1–3.3)
LYMPHOCYTES # BLD AUTO: 7 % — LOW (ref 13–44)
MCHC RBC-ENTMCNC: 27.8 PG — SIGNIFICANT CHANGE UP (ref 27–34)
MCHC RBC-ENTMCNC: 33.7 GM/DL — SIGNIFICANT CHANGE UP (ref 32–36)
MCV RBC AUTO: 82.6 FL — SIGNIFICANT CHANGE UP (ref 80–100)
MONOCYTES # BLD AUTO: 0.35 K/UL — SIGNIFICANT CHANGE UP (ref 0–0.9)
MONOCYTES NFR BLD AUTO: 4.2 % — SIGNIFICANT CHANGE UP (ref 2–14)
NEUTROPHILS # BLD AUTO: 7.41 K/UL — HIGH (ref 1.8–7.4)
NEUTROPHILS NFR BLD AUTO: 88.1 % — HIGH (ref 43–77)
NRBC # BLD: 0 /100 WBCS — SIGNIFICANT CHANGE UP
NRBC # FLD: 0 K/UL — SIGNIFICANT CHANGE UP
PLATELET # BLD AUTO: 239 K/UL — SIGNIFICANT CHANGE UP (ref 150–400)
POTASSIUM SERPL-MCNC: 3.8 MMOL/L — SIGNIFICANT CHANGE UP (ref 3.5–5.3)
POTASSIUM SERPL-SCNC: 3.8 MMOL/L — SIGNIFICANT CHANGE UP (ref 3.5–5.3)
PROT SERPL-MCNC: 9 G/DL — HIGH (ref 6–8.3)
PROTHROM AB SERPL-ACNC: 10.9 SEC — SIGNIFICANT CHANGE UP (ref 9.8–13.1)
RBC # BLD: 5.28 M/UL — SIGNIFICANT CHANGE UP (ref 4.2–5.8)
RBC # FLD: 13.6 % — SIGNIFICANT CHANGE UP (ref 10.3–14.5)
SODIUM SERPL-SCNC: 135 MMOL/L — SIGNIFICANT CHANGE UP (ref 135–145)
WBC # BLD: 8.41 K/UL — SIGNIFICANT CHANGE UP (ref 3.8–10.5)
WBC # FLD AUTO: 8.41 K/UL — SIGNIFICANT CHANGE UP (ref 3.8–10.5)

## 2020-12-20 PROCEDURE — 99284 EMERGENCY DEPT VISIT MOD MDM: CPT

## 2020-12-20 PROCEDURE — 76705 ECHO EXAM OF ABDOMEN: CPT | Mod: 26

## 2020-12-20 PROCEDURE — 71046 X-RAY EXAM CHEST 2 VIEWS: CPT | Mod: 26

## 2020-12-20 RX ORDER — ACETAMINOPHEN 500 MG
975 TABLET ORAL ONCE
Refills: 0 | Status: COMPLETED | OUTPATIENT
Start: 2020-12-20 | End: 2020-12-20

## 2020-12-20 RX ORDER — ONDANSETRON 8 MG/1
4 TABLET, FILM COATED ORAL ONCE
Refills: 0 | Status: COMPLETED | OUTPATIENT
Start: 2020-12-20 | End: 2020-12-20

## 2020-12-20 RX ORDER — ONDANSETRON 8 MG/1
1 TABLET, FILM COATED ORAL
Qty: 9 | Refills: 0
Start: 2020-12-20 | End: 2020-12-22

## 2020-12-20 RX ORDER — SODIUM CHLORIDE 9 MG/ML
1000 INJECTION INTRAMUSCULAR; INTRAVENOUS; SUBCUTANEOUS ONCE
Refills: 0 | Status: COMPLETED | OUTPATIENT
Start: 2020-12-20 | End: 2020-12-20

## 2020-12-20 RX ORDER — THIAMINE MONONITRATE (VIT B1) 100 MG
500 TABLET ORAL ONCE
Refills: 0 | Status: COMPLETED | OUTPATIENT
Start: 2020-12-20 | End: 2020-12-20

## 2020-12-20 RX ORDER — LIDOCAINE 4 G/100G
10 CREAM TOPICAL ONCE
Refills: 0 | Status: COMPLETED | OUTPATIENT
Start: 2020-12-20 | End: 2020-12-20

## 2020-12-20 RX ORDER — LIDOCAINE 4 G/100G
10 CREAM TOPICAL ONCE
Refills: 0 | Status: DISCONTINUED | OUTPATIENT
Start: 2020-12-20 | End: 2020-12-23

## 2020-12-20 RX ADMIN — LIDOCAINE 10 MILLILITER(S): 4 CREAM TOPICAL at 11:47

## 2020-12-20 RX ADMIN — ONDANSETRON 4 MILLIGRAM(S): 8 TABLET, FILM COATED ORAL at 13:39

## 2020-12-20 RX ADMIN — Medication 30 MILLILITER(S): at 11:47

## 2020-12-20 RX ADMIN — ONDANSETRON 4 MILLIGRAM(S): 8 TABLET, FILM COATED ORAL at 11:47

## 2020-12-20 RX ADMIN — Medication 105 MILLIGRAM(S): at 13:00

## 2020-12-20 RX ADMIN — SODIUM CHLORIDE 1000 MILLILITER(S): 9 INJECTION INTRAMUSCULAR; INTRAVENOUS; SUBCUTANEOUS at 11:48

## 2020-12-20 RX ADMIN — Medication 50 MILLIGRAM(S): at 11:47

## 2020-12-20 RX ADMIN — Medication 975 MILLIGRAM(S): at 11:45

## 2020-12-20 NOTE — ED PROVIDER NOTE - CLINICAL SUMMARY MEDICAL DECISION MAKING FREE TEXT BOX
Bossman AGARWAL PGY2: 41 yo Grisel speaking male with a PMHx of alcohol abuse w/ withdrawal (no hx of seizures or intubation) presenting with 1 day of nausea and nbnb vomiting (10 x per hr) iso recent vodka binge (12 shots per day x 4 days) w/ +RUQ on exam. ddx included viral gastroenteritis vs. pancreatitis vs. gallstones. Will check CBC, CMP, lipase and evaluate RUQ US. Zofran PRN nausea, NaCl bolus and viscous lidocaine for symptomatic relief. Bossman AGARWAL PGY2: 41 yo Grisel speaking male with a PMHx of alcohol abuse w/ withdrawal (no hx of seizures or intubation) presenting with 1 day of nausea and nbnb vomiting (10 x per hr) iso recent vodka binge (12 shots per day x 4 days, last drink 12/19 at 9:00) w/ +RUQ on exam.  ddx included viral gastroenteritis vs. pancreatitis vs. gallstones. Will check CBC, CMP, lipase and evaluate RUQ US. Zofran PRN nausea, NaCl bolus and viscous lidocaine for symptomatic relief.

## 2020-12-20 NOTE — ED PROVIDER NOTE - NSFOLLOWUPINSTRUCTIONS_ED_ALL_ED_FT
You presented to the hospital with nausea, vomiting and abdominal pain. This was likely due to your recent alcohol bingeing episode. We checked an ultrasound of your abdomen that showed. You presented to the hospital with nausea, vomiting and abdominal pain. This was likely due to your recent alcohol bingeing episode. We checked an ultrasound of your abdomen that showed.... We treated you with IV fluids. Please follow up with a primary care doctor within 3-5 days. Please return to the hospital if you have continuous nausea and vomiting and are unable to tolerate oral intake or if you are withdrawing from alcohol.       Abuse of Alcohol    WHAT YOU NEED TO KNOW:    What is alcohol abuse? Alcohol abuse means you drink more than the recommended daily or weekly limits. You may be drinking alcohol regularly or drinking large amounts in a short period of time (binge drinking). You continue to drink even though it causes legal, work, or relationship problems.    What do I need to know about recommended alcohol limits?   •Men 21 to 64 years should limit alcohol to 2 drinks a day. Do not have more than 4 drinks in 1 day or more than 14 in 1 week.      •All women, and men 65 or older should limit alcohol to 1 drink in a day. Do not have more than 3 drinks in 1 day or more than 7 in 1 week. No amount of alcohol is okay during pregnancy.      What are the signs and symptoms of alcohol abuse?   •Loss of interest in activities, work, and school      •Hiding alcohol, or drinking in private      •Depression, or guilt about drinking      •Constant thoughts about alcohol      •Drinking in the morning to relieve the effects of a hangover      •Not being able to control the amount you drink      •Restlessness, or erratic and violent behavior      What health problems can alcohol abuse cause?   •Cancer in your liver, pancreas, stomach, colon, kidney, or breast      •Stroke or a heart attack      •Liver, kidney, or lung disease      •Blackouts, memory loss, brain damage, or dementia      •Diabetes, immune system problems, or thiamine (vitamin B1) deficiency      •Problems for you and your baby if you drink while pregnant      How is alcohol abuse treated? Treatment helps you understand the reasons you abuse alcohol. Counselors and therapists provide you with support and help you find ways to cope instead of drinking. You may need inpatient treatment to provide a controlled environment. You may need outpatient treatment after your inpatient treatment is complete.  •Detoxification (detox) is a program used to flush alcohol from your body. During detox, medicines are given to help prevent withdrawal symptoms when you stop drinking alcohol.      •In brief intervention therapy, a healthcare provider helps you think about your alcohol use differently. He or she helps you set goals to decrease the amount of alcohol you drink. Therapy may continue after you leave the hospital.      •Vitamin supplements such as B1 may be needed. Alcohol can make it hard for your body to absorb enough vitamin B1. You may be given vitamin B1 if you have low levels. It is also given to prevent brain damage from alcohol use.      What can I do to manage my alcohol use?   •Decrease the amount you drink. This can help prevent health problems such as brain, heart, and liver damage, high blood pressure, diabetes, and cancer. If you cannot stop completely, healthcare providers can help you set goals to decrease the amount you drink.      •Plan weekly alcohol use. You will be less likely to drink more than the recommended limit if you plan ahead.      •Have food when you drink alcohol. Food will prevent alcohol from getting into your system too quickly. Eat before you have your first alcohol drink.      •Time your drinks carefully. Have no more than 1 drink in an hour. Have a liquid such as water, coffee, or a soft drink between alcohol drinks.      •Do not drive if you have had alcohol. Make sure someone who has not been drinking can help you get home.      •Do not drink alcohol if you are taking medicine. Alcohol is dangerous when you combine it with certain medicines, such as acetaminophen or blood pressure medicine. Talk to your healthcare provider about all the medicines you currently take.      Where can I find support and more information?   •Alcoholics Anonymous  Web Address: http://www.aa.org      •Substance Abuse and Mental Health Services Administration  [address]  [place] [address]  Web Address: http://www.[facility].gov        Call your local emergency number (911 in the [place]) for any of the following:   •You have sudden chest pain or trouble breathing.      •You want to harm yourself or others.      •You have a seizure or have shaking or trembling.      When should I call my doctor?   •You have hallucinations (you see or hear things that are not real).      •You cannot stop vomiting or you vomit blood.      •You need help to stop drinking alcohol.       •You have questions or concerns about your condition or care.      CARE AGREEMENT:    You have the right to help plan your care. Learn about your health condition and how it may be treated. Discuss treatment options with your healthcare providers to decide what care you want to receive. You always have the right to refuse treatment. You presented to the hospital with nausea, vomiting and abdominal pain. This was likely due to your recent alcohol bingeing episode. We checked an ultrasound of your abdomen that showed and enlarged fatty liver, a condition that can be related to excess weight gain from alcohol use. We treated you with IV fluids and anti-nausea medication. Please follow up with a primary care doctor within 3-5 days. Please return to the hospital if you have continuous nausea and vomiting and are unable to tolerate oral intake or if you are withdrawing from alcohol.       Abuse of Alcohol    WHAT YOU NEED TO KNOW:    What is alcohol abuse? Alcohol abuse means you drink more than the recommended daily or weekly limits. You may be drinking alcohol regularly or drinking large amounts in a short period of time (binge drinking). You continue to drink even though it causes legal, work, or relationship problems.    What do I need to know about recommended alcohol limits?   •Men 21 to 64 years should limit alcohol to 2 drinks a day. Do not have more than 4 drinks in 1 day or more than 14 in 1 week.      •All women, and men 65 or older should limit alcohol to 1 drink in a day. Do not have more than 3 drinks in 1 day or more than 7 in 1 week. No amount of alcohol is okay during pregnancy.      What are the signs and symptoms of alcohol abuse?   •Loss of interest in activities, work, and school      •Hiding alcohol, or drinking in private      •Depression, or guilt about drinking      •Constant thoughts about alcohol      •Drinking in the morning to relieve the effects of a hangover      •Not being able to control the amount you drink      •Restlessness, or erratic and violent behavior      What health problems can alcohol abuse cause?   •Cancer in your liver, pancreas, stomach, colon, kidney, or breast      •Stroke or a heart attack      •Liver, kidney, or lung disease      •Blackouts, memory loss, brain damage, or dementia      •Diabetes, immune system problems, or thiamine (vitamin B1) deficiency      •Problems for you and your baby if you drink while pregnant      How is alcohol abuse treated? Treatment helps you understand the reasons you abuse alcohol. Counselors and therapists provide you with support and help you find ways to cope instead of drinking. You may need inpatient treatment to provide a controlled environment. You may need outpatient treatment after your inpatient treatment is complete.  •Detoxification (detox) is a program used to flush alcohol from your body. During detox, medicines are given to help prevent withdrawal symptoms when you stop drinking alcohol.      •In brief intervention therapy, a healthcare provider helps you think about your alcohol use differently. He or she helps you set goals to decrease the amount of alcohol you drink. Therapy may continue after you leave the hospital.      •Vitamin supplements such as B1 may be needed. Alcohol can make it hard for your body to absorb enough vitamin B1. You may be given vitamin B1 if you have low levels. It is also given to prevent brain damage from alcohol use.      What can I do to manage my alcohol use?   •Decrease the amount you drink. This can help prevent health problems such as brain, heart, and liver damage, high blood pressure, diabetes, and cancer. If you cannot stop completely, healthcare providers can help you set goals to decrease the amount you drink.      •Plan weekly alcohol use. You will be less likely to drink more than the recommended limit if you plan ahead.      •Have food when you drink alcohol. Food will prevent alcohol from getting into your system too quickly. Eat before you have your first alcohol drink.      •Time your drinks carefully. Have no more than 1 drink in an hour. Have a liquid such as water, coffee, or a soft drink between alcohol drinks.      •Do not drive if you have had alcohol. Make sure someone who has not been drinking can help you get home.      •Do not drink alcohol if you are taking medicine. Alcohol is dangerous when you combine it with certain medicines, such as acetaminophen or blood pressure medicine. Talk to your healthcare provider about all the medicines you currently take.      Where can I find support and more information?   •Alcoholics Anonymous  Web Address: http://www.aa.org      •Substance Abuse and Mental Health Services Administration  [address]  [place] [address]  Web Address: http://www.[facility].gov        Call your local emergency number (917 in the [place]) for any of the following:   •You have sudden chest pain or trouble breathing.      •You want to harm yourself or others.      •You have a seizure or have shaking or trembling.      When should I call my doctor?   •You have hallucinations (you see or hear things that are not real).      •You cannot stop vomiting or you vomit blood.      •You need help to stop drinking alcohol.       •You have questions or concerns about your condition or care.      CARE AGREEMENT:    You have the right to help plan your care. Learn about your health condition and how it may be treated. Discuss treatment options with your healthcare providers to decide what care you want to receive. You always have the right to refuse treatment. You presented to the hospital with nausea, vomiting and abdominal pain. This was likely due to your recent alcohol bingeing episode. We checked an ultrasound of your abdomen that showed an enlarged fatty liver, a condition that can be related to excess weight gain from alcohol use. We treated you with IV fluids and anti-nausea medication. Please follow up with a primary care doctor within 3-5 days. Please return to the hospital if you have continuous nausea and vomiting and are unable to tolerate oral intake or if you are withdrawing from alcohol.       Abuse of Alcohol    WHAT YOU NEED TO KNOW:    What is alcohol abuse? Alcohol abuse means you drink more than the recommended daily or weekly limits. You may be drinking alcohol regularly or drinking large amounts in a short period of time (binge drinking). You continue to drink even though it causes legal, work, or relationship problems.    What do I need to know about recommended alcohol limits?   •Men 21 to 64 years should limit alcohol to 2 drinks a day. Do not have more than 4 drinks in 1 day or more than 14 in 1 week.      •All women, and men 65 or older should limit alcohol to 1 drink in a day. Do not have more than 3 drinks in 1 day or more than 7 in 1 week. No amount of alcohol is okay during pregnancy.      What are the signs and symptoms of alcohol abuse?   •Loss of interest in activities, work, and school      •Hiding alcohol, or drinking in private      •Depression, or guilt about drinking      •Constant thoughts about alcohol      •Drinking in the morning to relieve the effects of a hangover      •Not being able to control the amount you drink      •Restlessness, or erratic and violent behavior      What health problems can alcohol abuse cause?   •Cancer in your liver, pancreas, stomach, colon, kidney, or breast      •Stroke or a heart attack      •Liver, kidney, or lung disease      •Blackouts, memory loss, brain damage, or dementia      •Diabetes, immune system problems, or thiamine (vitamin B1) deficiency      •Problems for you and your baby if you drink while pregnant      How is alcohol abuse treated? Treatment helps you understand the reasons you abuse alcohol. Counselors and therapists provide you with support and help you find ways to cope instead of drinking. You may need inpatient treatment to provide a controlled environment. You may need outpatient treatment after your inpatient treatment is complete.  •Detoxification (detox) is a program used to flush alcohol from your body. During detox, medicines are given to help prevent withdrawal symptoms when you stop drinking alcohol.      •In brief intervention therapy, a healthcare provider helps you think about your alcohol use differently. He or she helps you set goals to decrease the amount of alcohol you drink. Therapy may continue after you leave the hospital.      •Vitamin supplements such as B1 may be needed. Alcohol can make it hard for your body to absorb enough vitamin B1. You may be given vitamin B1 if you have low levels. It is also given to prevent brain damage from alcohol use.      What can I do to manage my alcohol use?   •Decrease the amount you drink. This can help prevent health problems such as brain, heart, and liver damage, high blood pressure, diabetes, and cancer. If you cannot stop completely, healthcare providers can help you set goals to decrease the amount you drink.      •Plan weekly alcohol use. You will be less likely to drink more than the recommended limit if you plan ahead.      •Have food when you drink alcohol. Food will prevent alcohol from getting into your system too quickly. Eat before you have your first alcohol drink.      •Time your drinks carefully. Have no more than 1 drink in an hour. Have a liquid such as water, coffee, or a soft drink between alcohol drinks.      •Do not drive if you have had alcohol. Make sure someone who has not been drinking can help you get home.      •Do not drink alcohol if you are taking medicine. Alcohol is dangerous when you combine it with certain medicines, such as acetaminophen or blood pressure medicine. Talk to your healthcare provider about all the medicines you currently take.      Where can I find support and more information?   •Alcoholics Anonymous  Web Address: http://www.aa.org      •Substance Abuse and Mental Health Services Administration  [address]  [place] [address]  Web Address: http://[facility].gov        Call your local emergency number (914 in the [place]) for any of the following:   •You have sudden chest pain or trouble breathing.      •You want to harm yourself or others.      •You have a seizure or have shaking or trembling.      When should I call my doctor?   •You have hallucinations (you see or hear things that are not real).      •You cannot stop vomiting or you vomit blood.      •You need help to stop drinking alcohol.       •You have questions or concerns about your condition or care.      CARE AGREEMENT:    You have the right to help plan your care. Learn about your health condition and how it may be treated. Discuss treatment options with your healthcare providers to decide what care you want to receive. You always have the right to refuse treatment.

## 2020-12-20 NOTE — ED PROVIDER NOTE - PHYSICAL EXAMINATION
General: middle aged male in NAD on RA   HEENT: NCAT.  PERRL.  EOMI.  No scleral icterus or injection.  Moist MM.  No oropharyngeal exudates. No head and neck LAD   Heart: tachycardic. Normal S1 and S2.  No murmurs, rubs, or gallops.   Lungs: CTAB. No wheezes, crackles, or rhonchi.    Abdomen: BS+, soft, NT/ND.  No organomegaly. TTP bl ribs and RUQ. No guarding, rebound, rigidity.   Skin: Warm and dry.  No rashes.  Extremities: No edema   Neuro: A&Ox3.  No tremors noted.  Psych: no agitation

## 2020-12-20 NOTE — ED PROVIDER NOTE - OBJECTIVE STATEMENT
41 yo Grisel speaking male with a PMHx of alcohol abuse w/ withdrawal (no hx of seizures or intubation) presenting with 1 day of nausea and nbnb vomiting (10 x per hr) in the setting of recent alcohol bingeing. Patient endorses drinking 4 shots of vodka 3 times a day for 4 days. Last drink at 9:00 on 12/19 (day prior to presentation). N/V associated with epigastric abd pain and bl rib pain at the time of emesis as well as throat pain and dec po intake x 1 day. Denies fevers, chills, cough, cp, sob, hematemesis, diarrhea. No sick contacts.

## 2020-12-20 NOTE — ED PROVIDER NOTE - PROGRESS NOTE DETAILS
All results d/w patient and copies given with instructions to bring with them to their follow up appointment.  The patient was given verbal and written discharge instructions Specifically, instructions when to return to the ED and to seek follow-up from their pcp within 1-2 days. The patient understands that should their symptoms worsen or any new symptoms arise, they should return to the ED immediately for further evaluation.  Vss, NAD, tolerating po and ambulating steadily at discharge. no signs of withdrawal at dc.

## 2020-12-20 NOTE — ED ADULT NURSE NOTE - OBJECTIVE STATEMENT
pt received spot 13. pt is A+Ox3, c/o n/v x 1 day. pt reports drinking 4 shots of vodka. 3times a day for past 4 days. pt denies epigastric pain secondary to multiple episodes of vomiting. no sign of withdrawal noted at this time. medicated as ordered. will monitor.

## 2020-12-20 NOTE — ED ADULT NURSE NOTE - CHIEF COMPLAINT QUOTE
chano speaking vomiting since yesterday. denies diarrhea, abd pains. wife as  who states pt has been drinking alcohol x 4 days 4-6 shots per day.  [phone] wife lamin. denies headache. no tremors noted

## 2020-12-20 NOTE — ED ADULT TRIAGE NOTE - CHIEF COMPLAINT QUOTE
chano speaking vomiting since yesterday. denies diarrhea, abd pains. wife as  who states pt has been drinking alcohol x 4 days 4-6 shots per day.  [phone]- wife lamin. denies headache. no tremors noted

## 2020-12-20 NOTE — ED PROVIDER NOTE - PATIENT PORTAL LINK FT
You can access the FollowMyHealth Patient Portal offered by [facility] by registering at the following website: [email or web address]. By joining [employer]’s FollowMyHealth portal, you will also be able to view your health information using other applications (apps) compatible with our system.

## 2020-12-20 NOTE — ED PROVIDER NOTE - ATTENDING CONTRIBUTION TO CARE
agree with above hpi  on my exam  vital signs: T(C): 37.7 (12-20-20 @ 10:23), Max: 37.7 (12-20-20 @ 10:23)  HR: 110 (12-20-20 @ 10:23) (110 - 110)  BP: 141/95 (12-20-20 @ 10:23) (141/95 - 141/95)  BP(mean): --  RR: 18 (12-20-20 @ 10:23) (18 - 18)  SpO2: 100% (12-20-20 @ 10:23) (100% - 100%)  GEN - NAD; well appearing; A+O x3   HEAD - NC/AT   EYES- PERRL, EOMI  ENT: Airway patent, dry mm, Oral cavity and pharynx normal. No inflammation, swelling, exudate, or lesions.  NECK: Neck supple, non-tender without lymphadenopathy, no masses.  PULMONARY - CTA b/l, symmetric breath sounds.   CARDIAC -s1s2, RRR, no M,G,R  ABDOMEN - +BS, ND, mild ttp ruq, soft, no guarding, no rebound, no masses   BACK - no CVA tenderness, Normal  spine   EXTREMITIES - FROM, symmetric pulses, capillary refill < 2 seconds, no edema   SKIN - no rash or bruising   NEUROLOGIC - alert, speech clear, no focal deficits  PSYCH -nl mood/affect, nl insight.  Patient presents to the ed with multiple episodes nb/nb emesis today in setting of 4-5 days of binge drinking approx 12 shots per day of liquor. Patient states he has upper abd pain across upper abd, no other radiation, primarily after vomiting, none at this time. Has h/o withdrawal does not feel like he is in withdrawal at this time, not tremulous, no tongue fasciculations. Patients exam with mild ttp to epigastric/ruq region, dry mm, otherwise grossly benign, ao3, speaking clearly and with appropriate insight into condition-clinically sober. WIll check labs, ruq us, hydrate, symptomatic treatment-eval for pancreatitis, cholecystitis, elec disturbance, organ dysfunction, dehydration, possibly gastritis 2/2 increased etoh in last few d.

## 2020-12-20 NOTE — ED PROVIDER NOTE - NS ED ROS FT
REVIEW OF SYSTEMS:    CONSTITUTIONAL: No weakness, fevers or chills  EYES/ENT: No visual changes;  No vertigo or throat pain   NECK: No pain or stiffness  RESPIRATORY: No cough, wheezing, hemoptysis; No shortness of breath  CARDIOVASCULAR: No chest pain or palpitations  GASTROINTESTINAL: per HPI  GENITOURINARY: No dysuria, frequency or hematuria  NEUROLOGICAL: No numbness or weakness  SKIN: No itching, burning, rashes, or lesions   PSYCH: no tremors, agitation   All other review of systems is negative unless indicated above.

## 2021-01-23 ENCOUNTER — INPATIENT (INPATIENT)
Facility: HOSPITAL | Age: 46
LOS: 0 days | Discharge: LEFT BEFORE DISCHARGE | End: 2021-01-24
Attending: STUDENT IN AN ORGANIZED HEALTH CARE EDUCATION/TRAINING PROGRAM | Admitting: STUDENT IN AN ORGANIZED HEALTH CARE EDUCATION/TRAINING PROGRAM
Payer: MEDICAID

## 2021-01-23 VITALS
HEIGHT: 71 IN | HEART RATE: 122 BPM | OXYGEN SATURATION: 100 % | SYSTOLIC BLOOD PRESSURE: 156 MMHG | TEMPERATURE: 98 F | DIASTOLIC BLOOD PRESSURE: 113 MMHG | RESPIRATION RATE: 13 BRPM

## 2021-01-23 LAB
ALBUMIN SERPL ELPH-MCNC: 5.2 G/DL — HIGH (ref 3.3–5)
ALP SERPL-CCNC: 97 U/L — SIGNIFICANT CHANGE UP (ref 40–120)
ALT FLD-CCNC: 48 U/L — HIGH (ref 4–41)
ANION GAP SERPL CALC-SCNC: 18 MMOL/L — HIGH (ref 7–14)
AST SERPL-CCNC: 56 U/L — HIGH (ref 4–40)
BASOPHILS # BLD AUTO: 0.06 K/UL — SIGNIFICANT CHANGE UP (ref 0–0.2)
BASOPHILS NFR BLD AUTO: 0.9 % — SIGNIFICANT CHANGE UP (ref 0–2)
BILIRUB SERPL-MCNC: 1.1 MG/DL — SIGNIFICANT CHANGE UP (ref 0.2–1.2)
BLOOD GAS VENOUS COMPREHENSIVE RESULT: SIGNIFICANT CHANGE UP
BUN SERPL-MCNC: 22 MG/DL — SIGNIFICANT CHANGE UP (ref 7–23)
CALCIUM SERPL-MCNC: 10.4 MG/DL — SIGNIFICANT CHANGE UP (ref 8.4–10.5)
CHLORIDE SERPL-SCNC: 86 MMOL/L — LOW (ref 98–107)
CO2 SERPL-SCNC: 27 MMOL/L — SIGNIFICANT CHANGE UP (ref 22–31)
CREAT SERPL-MCNC: 0.85 MG/DL — SIGNIFICANT CHANGE UP (ref 0.5–1.3)
EOSINOPHIL # BLD AUTO: 0 K/UL — SIGNIFICANT CHANGE UP (ref 0–0.5)
EOSINOPHIL NFR BLD AUTO: 0 % — SIGNIFICANT CHANGE UP (ref 0–6)
GLUCOSE SERPL-MCNC: 193 MG/DL — HIGH (ref 70–99)
HCT VFR BLD CALC: 40.1 % — SIGNIFICANT CHANGE UP (ref 39–50)
HGB BLD-MCNC: 13.7 G/DL — SIGNIFICANT CHANGE UP (ref 13–17)
IANC: 5.81 K/UL — SIGNIFICANT CHANGE UP (ref 1.5–8.5)
LYMPHOCYTES # BLD AUTO: 0.35 K/UL — LOW (ref 1–3.3)
LYMPHOCYTES # BLD AUTO: 5.2 % — LOW (ref 13–44)
MCHC RBC-ENTMCNC: 27.2 PG — SIGNIFICANT CHANGE UP (ref 27–34)
MCHC RBC-ENTMCNC: 34.2 GM/DL — SIGNIFICANT CHANGE UP (ref 32–36)
MCV RBC AUTO: 79.6 FL — LOW (ref 80–100)
MONOCYTES # BLD AUTO: 0.29 K/UL — SIGNIFICANT CHANGE UP (ref 0–0.9)
MONOCYTES NFR BLD AUTO: 4.3 % — SIGNIFICANT CHANGE UP (ref 2–14)
NEUTROPHILS # BLD AUTO: 5.86 K/UL — SIGNIFICANT CHANGE UP (ref 1.8–7.4)
NEUTROPHILS NFR BLD AUTO: 83.5 % — HIGH (ref 43–77)
PCP SPEC-MCNC: SIGNIFICANT CHANGE UP
PLATELET # BLD AUTO: 134 K/UL — LOW (ref 150–400)
POTASSIUM SERPL-MCNC: 3.9 MMOL/L — SIGNIFICANT CHANGE UP (ref 3.5–5.3)
POTASSIUM SERPL-SCNC: 3.9 MMOL/L — SIGNIFICANT CHANGE UP (ref 3.5–5.3)
PROT SERPL-MCNC: 9.2 G/DL — HIGH (ref 6–8.3)
RBC # BLD: 5.04 M/UL — SIGNIFICANT CHANGE UP (ref 4.2–5.8)
RBC # FLD: 14.9 % — HIGH (ref 10.3–14.5)
SODIUM SERPL-SCNC: 131 MMOL/L — LOW (ref 135–145)
TOXICOLOGY SCREEN, DRUGS OF ABUSE, SERUM RESULT: SIGNIFICANT CHANGE UP
WBC # BLD: 6.73 K/UL — SIGNIFICANT CHANGE UP (ref 3.8–10.5)
WBC # FLD AUTO: 6.73 K/UL — SIGNIFICANT CHANGE UP (ref 3.8–10.5)

## 2021-01-23 RX ORDER — THIAMINE MONONITRATE (VIT B1) 100 MG
100 TABLET ORAL ONCE
Refills: 0 | Status: COMPLETED | OUTPATIENT
Start: 2021-01-23 | End: 2021-01-23

## 2021-01-23 RX ORDER — SODIUM CHLORIDE 9 MG/ML
2000 INJECTION, SOLUTION INTRAVENOUS ONCE
Refills: 0 | Status: COMPLETED | OUTPATIENT
Start: 2021-01-23 | End: 2021-01-23

## 2021-01-23 RX ORDER — FOLIC ACID 0.8 MG
1 TABLET ORAL ONCE
Refills: 0 | Status: COMPLETED | OUTPATIENT
Start: 2021-01-23 | End: 2021-01-23

## 2021-01-23 RX ORDER — ONDANSETRON 8 MG/1
4 TABLET, FILM COATED ORAL ONCE
Refills: 0 | Status: COMPLETED | OUTPATIENT
Start: 2021-01-23 | End: 2021-01-23

## 2021-01-23 RX ADMIN — ONDANSETRON 4 MILLIGRAM(S): 8 TABLET, FILM COATED ORAL at 22:51

## 2021-01-23 RX ADMIN — SODIUM CHLORIDE 2000 MILLILITER(S): 9 INJECTION, SOLUTION INTRAVENOUS at 22:38

## 2021-01-23 RX ADMIN — Medication 2 MILLIGRAM(S): at 22:37

## 2021-01-23 RX ADMIN — Medication 100 MILLIGRAM(S): at 22:37

## 2021-01-23 RX ADMIN — Medication 1 TABLET(S): at 22:37

## 2021-01-23 NOTE — ED PROVIDER NOTE - PATIENT PORTAL LINK FT
You can access the FollowMyHealth Patient Portal offered by [facility] by registering at the following website: [email or web address]. By joining [employer]’s FollowMyHealth portal, you will also be able to view your health information using other applications (apps) compatible with our system.

## 2021-01-23 NOTE — ED PROVIDER NOTE - PROGRESS NOTE DETAILS
Tobias Chaudhary PGY3  upon chart review and speaking to wife, pt has been admitted for etoh withdrawal in past, wife states patient had "reaction" to ativan and had drowsiness and incontinence, states pt should not receive it.    given librium, pt does not wish to stay if admission required. Tobias Chaudhary PGY3  pt still tachycardic, mildly anxious, spoke to wife and pt, explained risk of worsening LAURA, agrees to inpatient admission. Tobias Chaudhary PGY3  patient expressed that he wishes to leave. He understands risks and still wishes to sign out AMA. explained to wife, understands and agrees. will give librium taper for sx for home.

## 2021-01-23 NOTE — ED PROVIDER NOTE - CLINICAL SUMMARY MEDICAL DECISION MAKING FREE TEXT BOX
46 yo male with pmhx etoh abuse p/w N/V. suggestive of etoh withdrawal. will evaluate with cbc cmp tox screen vbg, will give ivf, zofran, and will reassess. possible admission for further etoh withdrawal care

## 2021-01-23 NOTE — ED ADULT NURSE NOTE - OBJECTIVE STATEMENT
Received pt to rm 21, A&OX4, ambulatory. 46y/o male hx of Alcohol abuse complaining of n/v and "alcohol withdrawal." pt states he consistently drinks 3 shots per day, last drink was 48hours ago. Pt endorses multiple episodes of vomiting today (1/23). Pt noted to be mildly tremulous, mildly anxious, and complaining of nausea at this time. CIWA upon arrival is 6. MD Collado at bedside for eval. Resps are even and unlabored. Pt denies chest pain, abdominal pain, SOB, fever/chills, weakness/dizziness. 18G IV placed to left arm, labs sent, meds given as ordered. pt noted to be sinus tach on cardiac monitor. Appears calm and comfortable at this time.

## 2021-01-23 NOTE — ED PROVIDER NOTE - NSFOLLOWUPINSTRUCTIONS_ED_ALL_ED_FT
Activities as tolerated. Please encourage good oral and fluid intake. For pain, please take Tylenol 650mg every 6 hours as needed.    For withdrawal, please take librium 50mg three times a day as needed    Please see your primary care doctor within 24-48 hours for further management of your symptoms.    Please seek emergent medical management if you have any worsening signs or symptoms.

## 2021-01-23 NOTE — ED PROVIDER NOTE - OBJECTIVE STATEMENT
44 yo male with pmhx etoh abuse p/w N/V today. States he has had 20-30 NBNB vomiting episodes today, unable to tolerate PO intake. States he drank 3 shots per day for 2 days earlier this week, last drink 48 hours ago. States he drinks consistently. Denies any drug or tobacco us.  Denies prior admission for etoh/withdrawal.    Denies LOC, seizures, A/V hallucinations. 46 yo male with pmhx etoh abuse p/w N/V today. States he has had 20-30 NBNB vomiting episodes today, unable to tolerate PO intake. States he drank 3 shots per day for 2 days earlier this week, last drink 48 hours ago. States he drinks consistently. Denies any drug or tobacco us.  Denies prior admission for etoh/withdrawal.    Denies LOC, seizures, A/V hallucinations.  Attending - Agree with above.  I evaluated patient myself. 44 y/o M reports recently drinking EtOH daily, last drink was yesterday.  Today having nausea and vomiting.  Denies hx EtOH withdrawal. No seizure. Reports mild 'shakiness'.  Denies chest pain or shortness of breath.  Denies abd pain.  No fever, cough, covid dx. 46 yo male with pmhx etoh abuse p/w N/V today. States he has had 20-30 NBNB vomiting episodes today, unable to tolerate PO intake. States he drank 3 shots per day for 2 days earlier this week, last drink 48 hours ago. States he drinks consistently. Denies any drug or tobacco us.  Denies prior admission for etoh/withdrawal.    *upon chart review and speaking to wife, pt has been admitted for etoh withdrawal in past, wife states patient had "reaction" to ativan and had drowsiness and incontinence, states pt should not receive it.*    Denies LOC, seizures, A/V hallucinations.  Attending - Agree with above.  I evaluated patient myself. 44 y/o M reports recently drinking EtOH daily, last drink was yesterday.  Today having nausea and vomiting.  Denies hx EtOH withdrawal. No seizure. Reports mild 'shakiness'.  Denies chest pain or shortness of breath.  Denies abd pain.  No fever, cough, covid dx.

## 2021-01-23 NOTE — ED ADULT TRIAGE NOTE - CHIEF COMPLAINT QUOTE
Pt c/o N/V x 2 days.  Last drink yesterday.  Denies headache, chest pain, sob.  No tremors noted at triage

## 2021-01-23 NOTE — ED PROVIDER NOTE - PHYSICAL EXAMINATION
ATTENDING PHYSICAL EXAM  GEN - NAD; answers questions appropriately; A+O x3  HEAD - NC/AT; EYES/NOSE - PERRL, EOMI, mucous membranes moist, no discharge; THROAT: Oral cavity and pharynx normal. No inflammation, swelling, exudate, or lesions  NECK: Neck supple, non-tender without lymphadenopathy, no masses, no JVD  PULMONARY - CTA b/l, symmetric breath sounds, no w/r/r  CARDIAC -s1s2, RRR, no M,R,G  ABDOMEN - +NABS, ND, NT, soft, no guarding, no rebound, no masses   BACK - no CVA tenderness, No vertebral or paravertebral tenderness  EXTREMITIES - symmetric pulses, 2+ dp, capillary refill < 2 seconds, no clubbing, no cyanosis, no edema  SKIN - no rash or bruising   NEUROLOGIC - alert, CN 2-12 intact, mild tremor. no asterixis.  Motor 5/5 x 4 extremities.  SILT. ATTENDING PHYSICAL EXAM  GEN - NAD; answers questions appropriately; A+O x3  HEAD - NC/AT; EYES/NOSE - PERRL, EOMI, mucous membranes moist, no discharge; THROAT: Oral cavity and pharynx normal. No inflammation, swelling, exudate, or lesions  NECK: Neck supple, non-tender without lymphadenopathy, no masses, no JVD  PULMONARY - CTA b/l, symmetric breath sounds, no w/r/r  CARDIAC -s1s2, RRR, no M,R,G  ABDOMEN - +NABS, ND, NT, soft, no guarding, no rebound, no masses   BACK - no CVA tenderness, No vertebral or paravertebral tenderness  EXTREMITIES - symmetric pulses, 2+ dp, capillary refill < 2 seconds, no clubbing, no cyanosis, no edema  SKIN - no rash or bruising   NEUROLOGIC - alert, CN 2-12 intact, mild tremor. no asterixis.  Motor 5/5 x 4 extremities.  SILT.  CIWA 8-9.

## 2021-01-24 VITALS
HEART RATE: 104 BPM | DIASTOLIC BLOOD PRESSURE: 91 MMHG | SYSTOLIC BLOOD PRESSURE: 136 MMHG | RESPIRATION RATE: 20 BRPM | OXYGEN SATURATION: 100 %

## 2021-01-24 DIAGNOSIS — F10.239 ALCOHOL DEPENDENCE WITH WITHDRAWAL, UNSPECIFIED: ICD-10-CM

## 2021-01-24 LAB
BLOOD GAS VENOUS COMPREHENSIVE RESULT: SIGNIFICANT CHANGE UP
SARS-COV-2 RNA SPEC QL NAA+PROBE: SIGNIFICANT CHANGE UP

## 2021-01-24 PROCEDURE — 99285 EMERGENCY DEPT VISIT HI MDM: CPT

## 2021-01-24 RX ORDER — ONDANSETRON 8 MG/1
1 TABLET, FILM COATED ORAL
Qty: 15 | Refills: 0
Start: 2021-01-24 | End: 2021-01-28

## 2021-01-24 RX ADMIN — Medication 50 MILLIGRAM(S): at 01:48

## 2021-01-24 RX ADMIN — Medication 1 MILLIGRAM(S): at 00:22

## 2021-01-24 RX ADMIN — Medication 50 MILLIGRAM(S): at 00:29

## 2021-05-27 NOTE — ED PROVIDER NOTE - ATTENDING CONTRIBUTION TO CARE
Admission Reconciliation is Completed  Discharge Reconciliation is Not Complete Gm: 45 yo male with a h/o ETOH abuse- previous withdrawal but no seizures or admissions presents feeling "shaky" and innumerable episodes of nausea and NBNB vomiting that began yesterday. Symptoms began shortly after he stopped consuming ETOH. NO diarrhea, chest pain, fevers or chills. No constipation. Pt endorses that this is how he felt in the past when he had withdrawal. Wife at the bedside providing most of the history. Pt awake and alert and cooperative but a poor historian. Exam: GENERAL: ill appearing, NAD, HEENT: dry mucus membranes, PERRLA, no scleral icterus + tongue fasciculations CARDIO: +regular tachycardia LUNGS: CTA B/L, no wheezing, rales or rhonchi, ABD: soft, nontender, BSx4 quadrants, no guarding or rigidity. EXT: b/l extremity tremors NEURO: AxOx3, SKIN: no rashes or lesions, well perfused A/P- 45 yo male with ETOH withdrawal- pt tachycardic, hypertensive and tremulous with vomiting. will obtain labs, treat symptomatically and reassess. Admission Reconciliation is Completed  Discharge Reconciliation is Completed

## 2021-11-12 NOTE — ED PROVIDER NOTE - CROS ED CONS ALL NEG
11/12/2021    Tiff Del Rio is here today for worsening knee pain. Pt has undergone injection of the knee in the past with good resolution of symptoms. Pt is requesting a repeat injection.     KNEE Injection Procedure Note:    Large Joint Arthrocentesis: R knee  Date/Time: 11/12/2021 1:55 PM  Consent given by: patient  Site marked: site marked  Timeout: Immediately prior to procedure a time out was called to verify the correct patient, procedure, equipment, support staff and site/side marked as required   Supporting Documentation  Indications: pain and joint swelling   Procedure Details  Location: knee - R knee  Preparation: Patient was prepped and draped in the usual sterile fashion  Needle size: 22 G  Approach: anterolateral  Medications administered: 80 mg methylPREDNISolone acetate 80 MG/ML; 2 mL lidocaine PF 2% 2 %  Patient tolerance: patient tolerated the procedure well with no immediate complications    Large Joint Arthrocentesis: L knee  Date/Time: 11/12/2021 1:55 PM  Consent given by: patient  Site marked: site marked  Timeout: Immediately prior to procedure a time out was called to verify the correct patient, procedure, equipment, support staff and site/side marked as required   Supporting Documentation  Indications: pain and joint swelling   Procedure Details  Location: knee - L knee  Preparation: Patient was prepped and draped in the usual sterile fashion  Needle size: 22 G  Approach: anterolateral  Medications administered: 80 mg methylPREDNISolone acetate 80 MG/ML; 2 mL lidocaine PF 2% 2 %  Patient tolerance: patient tolerated the procedure well with no immediate complications          At the conclusion of the injection I discussed the importance of continued quad strengthening exercises on a daily basis. I will see the patient back if the symptoms should fail to improve or worsen.    Sandy Castaneda, APRN  11/12/2021       proximal negative...

## 2022-01-01 ENCOUNTER — INPATIENT (INPATIENT)
Facility: HOSPITAL | Age: 47
LOS: 0 days | Discharge: LEFT BEFORE DISCHARGE | End: 2022-01-02
Attending: HOSPITALIST | Admitting: HOSPITALIST
Payer: MEDICAID

## 2022-01-01 VITALS
TEMPERATURE: 98 F | DIASTOLIC BLOOD PRESSURE: 99 MMHG | RESPIRATION RATE: 20 BRPM | SYSTOLIC BLOOD PRESSURE: 169 MMHG | HEIGHT: 71 IN | HEART RATE: 130 BPM

## 2022-01-01 DIAGNOSIS — F10.239 ALCOHOL DEPENDENCE WITH WITHDRAWAL, UNSPECIFIED: ICD-10-CM

## 2022-01-01 DIAGNOSIS — Z00.00 ENCOUNTER FOR GENERAL ADULT MEDICAL EXAMINATION WITHOUT ABNORMAL FINDINGS: ICD-10-CM

## 2022-01-01 DIAGNOSIS — R74.8 ABNORMAL LEVELS OF OTHER SERUM ENZYMES: ICD-10-CM

## 2022-01-01 LAB
ALBUMIN SERPL ELPH-MCNC: 4 G/DL — SIGNIFICANT CHANGE UP (ref 3.3–5)
ALBUMIN SERPL ELPH-MCNC: 4.3 G/DL — SIGNIFICANT CHANGE UP (ref 3.3–5)
ALBUMIN SERPL ELPH-MCNC: 5.3 G/DL — HIGH (ref 3.3–5)
ALP SERPL-CCNC: 109 U/L — SIGNIFICANT CHANGE UP (ref 40–120)
ALP SERPL-CCNC: 84 U/L — SIGNIFICANT CHANGE UP (ref 40–120)
ALP SERPL-CCNC: 89 U/L — SIGNIFICANT CHANGE UP (ref 40–120)
ALT FLD-CCNC: 65 U/L — HIGH (ref 4–41)
ALT FLD-CCNC: 70 U/L — HIGH (ref 4–41)
ALT FLD-CCNC: 81 U/L — HIGH (ref 4–41)
AMPHET UR-MCNC: NEGATIVE — SIGNIFICANT CHANGE UP
ANION GAP SERPL CALC-SCNC: 14 MMOL/L — SIGNIFICANT CHANGE UP (ref 7–14)
ANION GAP SERPL CALC-SCNC: 15 MMOL/L — HIGH (ref 7–14)
ANION GAP SERPL CALC-SCNC: 21 MMOL/L — HIGH (ref 7–14)
AST SERPL-CCNC: 112 U/L — HIGH (ref 4–40)
AST SERPL-CCNC: 114 U/L — HIGH (ref 4–40)
AST SERPL-CCNC: 120 U/L — HIGH (ref 4–40)
BARBITURATES UR SCN-MCNC: NEGATIVE — SIGNIFICANT CHANGE UP
BASOPHILS # BLD AUTO: 0 K/UL — SIGNIFICANT CHANGE UP (ref 0–0.2)
BASOPHILS NFR BLD AUTO: 0 % — SIGNIFICANT CHANGE UP (ref 0–2)
BENZODIAZ UR-MCNC: NEGATIVE — SIGNIFICANT CHANGE UP
BILIRUB DIRECT SERPL-MCNC: 0.2 MG/DL — SIGNIFICANT CHANGE UP (ref 0–0.3)
BILIRUB DIRECT SERPL-MCNC: 0.2 MG/DL — SIGNIFICANT CHANGE UP (ref 0–0.3)
BILIRUB INDIRECT FLD-MCNC: 0.6 MG/DL — SIGNIFICANT CHANGE UP (ref 0–1)
BILIRUB INDIRECT FLD-MCNC: 0.6 MG/DL — SIGNIFICANT CHANGE UP (ref 0–1)
BILIRUB SERPL-MCNC: 0.8 MG/DL — SIGNIFICANT CHANGE UP (ref 0.2–1.2)
BUN SERPL-MCNC: 14 MG/DL — SIGNIFICANT CHANGE UP (ref 7–23)
BUN SERPL-MCNC: 16 MG/DL — SIGNIFICANT CHANGE UP (ref 7–23)
BUN SERPL-MCNC: 20 MG/DL — SIGNIFICANT CHANGE UP (ref 7–23)
CALCIUM SERPL-MCNC: 10.3 MG/DL — SIGNIFICANT CHANGE UP (ref 8.4–10.5)
CALCIUM SERPL-MCNC: 9.1 MG/DL — SIGNIFICANT CHANGE UP (ref 8.4–10.5)
CALCIUM SERPL-MCNC: 9.2 MG/DL — SIGNIFICANT CHANGE UP (ref 8.4–10.5)
CHLORIDE SERPL-SCNC: 86 MMOL/L — LOW (ref 98–107)
CHLORIDE SERPL-SCNC: 93 MMOL/L — LOW (ref 98–107)
CHLORIDE SERPL-SCNC: 93 MMOL/L — LOW (ref 98–107)
CO2 SERPL-SCNC: 25 MMOL/L — SIGNIFICANT CHANGE UP (ref 22–31)
CO2 SERPL-SCNC: 27 MMOL/L — SIGNIFICANT CHANGE UP (ref 22–31)
CO2 SERPL-SCNC: 27 MMOL/L — SIGNIFICANT CHANGE UP (ref 22–31)
COCAINE METAB.OTHER UR-MCNC: NEGATIVE — SIGNIFICANT CHANGE UP
CREAT SERPL-MCNC: 0.73 MG/DL — SIGNIFICANT CHANGE UP (ref 0.5–1.3)
CREAT SERPL-MCNC: 0.77 MG/DL — SIGNIFICANT CHANGE UP (ref 0.5–1.3)
CREAT SERPL-MCNC: 0.82 MG/DL — SIGNIFICANT CHANGE UP (ref 0.5–1.3)
CREATININE URINE RESULT, DAU: 247 MG/DL — SIGNIFICANT CHANGE UP
EOSINOPHIL # BLD AUTO: 0.06 K/UL — SIGNIFICANT CHANGE UP (ref 0–0.5)
EOSINOPHIL NFR BLD AUTO: 0.9 % — SIGNIFICANT CHANGE UP (ref 0–6)
GLUCOSE SERPL-MCNC: 108 MG/DL — HIGH (ref 70–99)
GLUCOSE SERPL-MCNC: 121 MG/DL — HIGH (ref 70–99)
GLUCOSE SERPL-MCNC: 217 MG/DL — HIGH (ref 70–99)
HCT VFR BLD CALC: 39.1 % — SIGNIFICANT CHANGE UP (ref 39–50)
HGB BLD-MCNC: 13.8 G/DL — SIGNIFICANT CHANGE UP (ref 13–17)
IANC: 5.98 K/UL — SIGNIFICANT CHANGE UP (ref 1.5–8.5)
LYMPHOCYTES # BLD AUTO: 0 % — LOW (ref 13–44)
LYMPHOCYTES # BLD AUTO: 0 K/UL — LOW (ref 1–3.3)
MAGNESIUM SERPL-MCNC: 1.6 MG/DL — SIGNIFICANT CHANGE UP (ref 1.6–2.6)
MAGNESIUM SERPL-MCNC: 1.7 MG/DL — SIGNIFICANT CHANGE UP (ref 1.6–2.6)
MCHC RBC-ENTMCNC: 28 PG — SIGNIFICANT CHANGE UP (ref 27–34)
MCHC RBC-ENTMCNC: 35.3 GM/DL — SIGNIFICANT CHANGE UP (ref 32–36)
MCV RBC AUTO: 79.3 FL — LOW (ref 80–100)
METHADONE UR-MCNC: NEGATIVE — SIGNIFICANT CHANGE UP
MONOCYTES # BLD AUTO: 0.23 K/UL — SIGNIFICANT CHANGE UP (ref 0–0.9)
MONOCYTES NFR BLD AUTO: 3.5 % — SIGNIFICANT CHANGE UP (ref 2–14)
NEUTROPHILS # BLD AUTO: 6.18 K/UL — SIGNIFICANT CHANGE UP (ref 1.8–7.4)
NEUTROPHILS NFR BLD AUTO: 92.9 % — HIGH (ref 43–77)
OPIATES UR-MCNC: NEGATIVE — SIGNIFICANT CHANGE UP
OXYCODONE UR-MCNC: NEGATIVE — SIGNIFICANT CHANGE UP
PCP SPEC-MCNC: SIGNIFICANT CHANGE UP
PCP UR-MCNC: NEGATIVE — SIGNIFICANT CHANGE UP
PHOSPHATE SERPL-MCNC: 2.7 MG/DL — SIGNIFICANT CHANGE UP (ref 2.5–4.5)
PHOSPHATE SERPL-MCNC: 3.1 MG/DL — SIGNIFICANT CHANGE UP (ref 2.5–4.5)
PLATELET # BLD AUTO: 119 K/UL — LOW (ref 150–400)
POTASSIUM SERPL-MCNC: 3.3 MMOL/L — LOW (ref 3.5–5.3)
POTASSIUM SERPL-MCNC: 3.6 MMOL/L — SIGNIFICANT CHANGE UP (ref 3.5–5.3)
POTASSIUM SERPL-MCNC: 3.6 MMOL/L — SIGNIFICANT CHANGE UP (ref 3.5–5.3)
POTASSIUM SERPL-SCNC: 3.3 MMOL/L — LOW (ref 3.5–5.3)
POTASSIUM SERPL-SCNC: 3.6 MMOL/L — SIGNIFICANT CHANGE UP (ref 3.5–5.3)
POTASSIUM SERPL-SCNC: 3.6 MMOL/L — SIGNIFICANT CHANGE UP (ref 3.5–5.3)
PROT SERPL-MCNC: 7.1 G/DL — SIGNIFICANT CHANGE UP (ref 6–8.3)
PROT SERPL-MCNC: 7.2 G/DL — SIGNIFICANT CHANGE UP (ref 6–8.3)
PROT SERPL-MCNC: 9 G/DL — HIGH (ref 6–8.3)
RBC # BLD: 4.93 M/UL — SIGNIFICANT CHANGE UP (ref 4.2–5.8)
RBC # FLD: 14.4 % — SIGNIFICANT CHANGE UP (ref 10.3–14.5)
SARS-COV-2 RNA SPEC QL NAA+PROBE: SIGNIFICANT CHANGE UP
SODIUM SERPL-SCNC: 132 MMOL/L — LOW (ref 135–145)
SODIUM SERPL-SCNC: 134 MMOL/L — LOW (ref 135–145)
SODIUM SERPL-SCNC: 135 MMOL/L — SIGNIFICANT CHANGE UP (ref 135–145)
THC UR QL: NEGATIVE — SIGNIFICANT CHANGE UP
WBC # BLD: 6.59 K/UL — SIGNIFICANT CHANGE UP (ref 3.8–10.5)
WBC # FLD AUTO: 6.59 K/UL — SIGNIFICANT CHANGE UP (ref 3.8–10.5)

## 2022-01-01 PROCEDURE — 99285 EMERGENCY DEPT VISIT HI MDM: CPT | Mod: 25

## 2022-01-01 PROCEDURE — 93010 ELECTROCARDIOGRAM REPORT: CPT

## 2022-01-01 PROCEDURE — 99223 1ST HOSP IP/OBS HIGH 75: CPT | Mod: GC

## 2022-01-01 RX ORDER — THIAMINE MONONITRATE (VIT B1) 100 MG
100 TABLET ORAL ONCE
Refills: 0 | Status: COMPLETED | OUTPATIENT
Start: 2022-01-01 | End: 2022-01-01

## 2022-01-01 RX ORDER — SODIUM CHLORIDE 9 MG/ML
1000 INJECTION, SOLUTION INTRAVENOUS
Refills: 0 | Status: DISCONTINUED | OUTPATIENT
Start: 2022-01-01 | End: 2022-01-02

## 2022-01-01 RX ORDER — POTASSIUM CHLORIDE 20 MEQ
40 PACKET (EA) ORAL ONCE
Refills: 0 | Status: COMPLETED | OUTPATIENT
Start: 2022-01-01 | End: 2022-01-01

## 2022-01-01 RX ORDER — THIAMINE MONONITRATE (VIT B1) 100 MG
1 TABLET ORAL
Qty: 0 | Refills: 0 | DISCHARGE

## 2022-01-01 RX ORDER — FOLIC ACID 0.8 MG
1 TABLET ORAL DAILY
Refills: 0 | Status: DISCONTINUED | OUTPATIENT
Start: 2022-01-01 | End: 2022-01-02

## 2022-01-01 RX ORDER — SODIUM CHLORIDE 9 MG/ML
1000 INJECTION INTRAMUSCULAR; INTRAVENOUS; SUBCUTANEOUS ONCE
Refills: 0 | Status: COMPLETED | OUTPATIENT
Start: 2022-01-01 | End: 2022-01-01

## 2022-01-01 RX ORDER — ENOXAPARIN SODIUM 100 MG/ML
40 INJECTION SUBCUTANEOUS EVERY 24 HOURS
Refills: 0 | Status: DISCONTINUED | OUTPATIENT
Start: 2022-01-01 | End: 2022-01-02

## 2022-01-01 RX ORDER — ONDANSETRON 8 MG/1
4 TABLET, FILM COATED ORAL ONCE
Refills: 0 | Status: COMPLETED | OUTPATIENT
Start: 2022-01-01 | End: 2022-01-01

## 2022-01-01 RX ORDER — THIAMINE MONONITRATE (VIT B1) 100 MG
100 TABLET ORAL DAILY
Refills: 0 | Status: DISCONTINUED | OUTPATIENT
Start: 2022-01-01 | End: 2022-01-02

## 2022-01-01 RX ORDER — FAMOTIDINE 10 MG/ML
20 INJECTION INTRAVENOUS ONCE
Refills: 0 | Status: COMPLETED | OUTPATIENT
Start: 2022-01-01 | End: 2022-01-01

## 2022-01-01 RX ORDER — ACETAMINOPHEN 500 MG
975 TABLET ORAL ONCE
Refills: 0 | Status: COMPLETED | OUTPATIENT
Start: 2022-01-01 | End: 2022-01-01

## 2022-01-01 RX ADMIN — Medication 1 TABLET(S): at 13:45

## 2022-01-01 RX ADMIN — Medication 100 MILLIGRAM(S): at 13:46

## 2022-01-01 RX ADMIN — Medication 975 MILLIGRAM(S): at 11:28

## 2022-01-01 RX ADMIN — ONDANSETRON 4 MILLIGRAM(S): 8 TABLET, FILM COATED ORAL at 07:53

## 2022-01-01 RX ADMIN — Medication 1 MILLIGRAM(S): at 13:45

## 2022-01-01 RX ADMIN — SODIUM CHLORIDE 1000 MILLILITER(S): 9 INJECTION INTRAMUSCULAR; INTRAVENOUS; SUBCUTANEOUS at 10:25

## 2022-01-01 RX ADMIN — Medication 40 MILLIEQUIVALENT(S): at 18:10

## 2022-01-01 RX ADMIN — Medication 2 MILLIGRAM(S): at 22:16

## 2022-01-01 RX ADMIN — Medication 2 MILLIGRAM(S): at 09:42

## 2022-01-01 RX ADMIN — Medication 100 MILLIGRAM(S): at 07:53

## 2022-01-01 RX ADMIN — Medication 4 MILLIGRAM(S): at 13:55

## 2022-01-01 RX ADMIN — Medication 2 MILLIGRAM(S): at 17:55

## 2022-01-01 RX ADMIN — Medication 40 MILLIEQUIVALENT(S): at 15:46

## 2022-01-01 RX ADMIN — SODIUM CHLORIDE 1000 MILLILITER(S): 9 INJECTION INTRAMUSCULAR; INTRAVENOUS; SUBCUTANEOUS at 09:32

## 2022-01-01 RX ADMIN — Medication 1 TABLET(S): at 07:52

## 2022-01-01 RX ADMIN — SODIUM CHLORIDE 1000 MILLILITER(S): 9 INJECTION INTRAMUSCULAR; INTRAVENOUS; SUBCUTANEOUS at 07:52

## 2022-01-01 RX ADMIN — ENOXAPARIN SODIUM 40 MILLIGRAM(S): 100 INJECTION SUBCUTANEOUS at 22:21

## 2022-01-01 RX ADMIN — FAMOTIDINE 20 MILLIGRAM(S): 10 INJECTION INTRAVENOUS at 07:53

## 2022-01-01 RX ADMIN — Medication 975 MILLIGRAM(S): at 10:22

## 2022-01-01 RX ADMIN — Medication 50 MILLIGRAM(S): at 07:53

## 2022-01-01 RX ADMIN — Medication 2 MILLIGRAM(S): at 10:15

## 2022-01-01 RX ADMIN — SODIUM CHLORIDE 1000 MILLILITER(S): 9 INJECTION INTRAMUSCULAR; INTRAVENOUS; SUBCUTANEOUS at 11:28

## 2022-01-01 RX ADMIN — SODIUM CHLORIDE 50 MILLILITER(S): 9 INJECTION, SOLUTION INTRAVENOUS at 15:47

## 2022-01-01 NOTE — H&P ADULT - NSHPSOCIALHISTORY_GEN_ALL_CORE
Denies tobacco use. Denies illicit drug use. Wife reports tequila up to 8 drinks/day starting 2 weeks ago. Lives at home with wife, brother, and nephew.

## 2022-01-01 NOTE — H&P ADULT - PROBLEM SELECTOR PLAN 1
- Per wife, ativan produced more confusion/sedative effect.  - Last drink reported two days prior to admission. Never admitted to ICU, no hx of intubation, no hx seizures.  - Per wife, ativan produced more confusion/sedative effect.  - c/w ativan taper  - c/w thiamine, vitamin, folate Last drink reported two days prior to admission. Never admitted to ICU, no hx of intubation, no hx seizures.  - Per wife, ativan produced more confusion/sedative effect.  - c/w ativan taper for now  - c/w thiamine, vitamin, folate

## 2022-01-01 NOTE — ED ADULT NURSE NOTE - OBJECTIVE STATEMENT
Patient came to ER with c/o alcohol withdrawal. Patient appears anxious and having tremors. Drinks 8 shots of vodka per day. last drink was yesterday. Patient alert and oriented x 4, ambulates in steady gait. safety maintained. 20 G IV line inserted in right AC. All labs sent.

## 2022-01-01 NOTE — H&P ADULT - PROBLEM SELECTOR PLAN 2
AST>ALT on presentation (114 and 81) characteristic of etoh pattern.  - trend on CMP AST>ALT on presentation (114 and 81) characteristic of etoh pattern.  - trend on CMP  - f/u INR

## 2022-01-01 NOTE — H&P ADULT - NSICDXFAMILYHX_GEN_ALL_CORE_FT
FAMILY HISTORY:  Father  Still living? Unknown  F/H of alcoholism, Age at diagnosis: Age Unknown

## 2022-01-01 NOTE — H&P ADULT - ASSESSMENT
45 yo M hx of etoh use disorder (multiple presentations in the past) p/w etoh w/d. Started on ativan taper.

## 2022-01-01 NOTE — DISCHARGE NOTE PROVIDER - HOSPITAL COURSE
47 yo M hx of etoh use disorder p/w etoh w/d. Per chart review, pt w/ multiple previous presentations for etoh w/d. Per wife, pt started drinking 8 drinks of tequila 2 weeks ago and last drink was 2 days ago. Never required ICU level care, intubation, no hx of seizures 2/2 etoh w/d. Wife states that pt had severe nausea, vomiting, and weakness prompting family to seek hospitalization for etoh w/d. Clarified with wife regarding ativan allergy; ativan induced more confusion and sluggishness. Denies CP, SOB, abd pain, hallucinations.    In ED vitals; T 98F, , /99, RR 20 on RA.  Received librium 50mg x1, pepcid 20mg x1, 2L NS, zofran 4mg x1, ativan 2mg x2, thiamine 100mg and multivitamin. Started on CIWA ativan taper. Highest CIWA in ED 13 for tremors, diaphoresis, headache predominant.      Hospital course:  Admitted to medicine on ativan taper. 47 yo M hx of etoh use disorder p/w etoh w/d. Per chart review, pt w/ multiple previous presentations for etoh w/d. Per wife, pt started drinking 8 drinks of tequila 2 weeks ago and last drink was 2 days ago. Never required ICU level care, intubation, no hx of seizures 2/2 etoh w/d. Wife states that pt had severe nausea, vomiting, and weakness prompting family to seek hospitalization for etoh w/d. Clarified with wife regarding ativan allergy; ativan induced more confusion and sluggishness. Denies CP, SOB, abd pain, hallucinations.    In ED vitals; T 98F, , /99, RR 20 on RA.  Received librium 50mg x1, pepcid 20mg x1, 2L NS, zofran 4mg x1, ativan 2mg x2, thiamine 100mg and multivitamin. Started on CIWA ativan taper. Highest CIWA in ED 13 for tremors, diaphoresis, headache predominant.      Hospital course:  Admitted to medicine on ativan taper. CIWA 1-2 ovn. Pt wife signed pt out AMA 1/2 stating she does not agree with treatment plan. 47 yo M hx of etoh use disorder p/w etoh w/d. Per chart review, pt w/ multiple previous presentations for etoh w/d. Per wife, pt started drinking 8 drinks of tequila 2 weeks ago and last drink was 2 days ago. Never required ICU level care, intubation, no hx of seizures 2/2 etoh w/d. Wife states that pt had severe nausea, vomiting, and weakness prompting family to seek hospitalization for etoh w/d. Clarified with wife regarding ativan allergy; ativan induced more confusion and sluggishness. Denies CP, SOB, abd pain, hallucinations.    In ED vitals; T 98F, , /99, RR 20 on RA.  Received librium 50mg x1, pepcid 20mg x1, 2L NS, zofran 4mg x1, ativan 2mg x2, thiamine 100mg and multivitamin. Started on CIWA ativan taper. Highest CIWA in ED 13 for tremors, diaphoresis, headache predominant.      Hospital course:  Admitted to medicine on ativan taper. CIWA 1-2 ovn. Pt wife signed pt out AMA 1/2 stating she does not agree with treatment plan.    Addendum:  46M etoh dependence. admit to medicine for EtOH withdrawal. patient is high risk CIWA on Ativan taper. wife is concern for use of ativan as it makes patient drowsy. Patient does not have capacity to leave, but wife wishes to take patient home AMA. instructed wife to monitor closely for signs of worsening alcohol withdrawal. AMA signed.

## 2022-01-01 NOTE — ED PROVIDER NOTE - CLINICAL SUMMARY MEDICAL DECISION MAKING FREE TEXT BOX
47 y/o M with PMH alcohol abuse presenting to the ED with concern for withdrawal. Patient is tachycardic upon arrival. He has few tremors but appears grossly stable. Plan to check labs and treat symptomatically. Will dose librium. Reassess, d/c vs admit depending on symptom improvement. Eduard Ray, DO PGY3

## 2022-01-01 NOTE — ED PROVIDER NOTE - PHYSICAL EXAMINATION
GENERAL: Awake, alert, NAD  HEENT: NC/AT, moist mucous membranes  LUNGS: CTAB, no wheezes or crackles   CARDIAC: tachycardic, regular rhythm, no m/r/g  ABDOMEN: Soft, normal BS, non tender, non distended, no rebound, no guarding  BACK: No midline spinal tenderness, no CVA tenderness  EXT: No edema, no calf tenderness, 2+ DP pulses bilaterally, no deformities.  NEURO: A&Ox3. Mildly tremulous. Few tongue fasciculations.  SKIN: Warm and dry. No rash.  PSYCH: Normal affect.

## 2022-01-01 NOTE — H&P ADULT - NSHPLABSRESULTS_GEN_ALL_CORE
13.8   6.59  )-----------( 119      ( 01 Jan 2022 08:53 )             39.1       01-01    134<L>  |  86<L>  |  20  ----------------------------<  217<H>  3.6   |  27  |  0.82    Ca    10.3      01 Jan 2022 08:53    TPro  9.0<H>  /  Alb  5.3<H>  /  TBili  0.8  /  DBili  x   /  AST  114<H>  /  ALT  81<H>  /  AlkPhos  109  01-01 13.8   6.59  )-----------( 119      ( 01 Jan 2022 08:53 )             39.1       01-01    134<L>  |  86<L>  |  20  ----------------------------<  217<H>  3.6   |  27  |  0.82    Ca    10.3      01 Jan 2022 08:53    TPro  9.0<H>  /  Alb  5.3<H>  /  TBili  0.8  /  DBili  x   /  AST  114<H>  /  ALT  81<H>  /  AlkPhos  109  01-01    EKG personally reviewed: Sinus tachycardia, TWI in V3-V6

## 2022-01-01 NOTE — ED ADULT NURSE REASSESSMENT NOTE - NS ED NURSE REASSESS COMMENT FT1
Pt diaphoretic and asking for meds.  CIWA assessed.  MD notified.  Ativan given as ordered.  Ativan allergy causes drowsiness.  CM placed.  VS as per flow sheet.

## 2022-01-01 NOTE — DISCHARGE NOTE PROVIDER - NSDCCPCAREPLAN_GEN_ALL_CORE_FT
PRINCIPAL DISCHARGE DIAGNOSIS  Diagnosis: Alcohol withdrawal  Assessment and Plan of Treatment: You were admitted with concerns for alcohol withdrawal. You were put on an ativan taper and monitored for symptoms. Please abstain from drinking alcohol and follow up with an outpatient provider for further maintance.       PRINCIPAL DISCHARGE DIAGNOSIS  Diagnosis: Alcohol withdrawal  Assessment and Plan of Treatment: You were admitted with concerns for alcohol withdrawal. You were put on an ativan taper and monitored for symptoms. Please abstain from drinking alcohol and follow up with an outpatient provider for further maintance. Alcohol withdrawal is a potentially life threatening condition if untreated in a timely manner.  Please return to the hospital if you notice any worsening tremors, seizures, or additional concerns.

## 2022-01-01 NOTE — DISCHARGE NOTE PROVIDER - NSFOLLOWUPCLINICS_GEN_ALL_ED_FT
[facility] [place]  Internal Medicine  [address] [place] [address]  Phone: [phone]  Fax: [phone]

## 2022-01-01 NOTE — ED PROVIDER NOTE - OBJECTIVE STATEMENT
45 y/o M with PMH alcohol abuse presenting to the ED with concern for withdrawal. Patient states he has been drinking for twenty years. Reports he drinks eight drinks of tequila per day. His last drink was two days ago. He states he has been feeling tremulous and also nauseous. He states he would like to stop drinking. Denies hallucinations. No CP, SOB, fever, chills.

## 2022-01-01 NOTE — H&P ADULT - NSHPREVIEWOFSYSTEMS_GEN_ALL_CORE
GENERAL: +weakness. no fever, chills  HEENT: no throat pain, cough, congestion, dysphagia  CARDIAC: no chest pain, palpitations  PULM: no shortness of breath, cough, wheezing   GI: +nausea/vomiting. No abdominal pain, diarrhea, constipation  : no dysuria, frequency  NEURO: no headache, lightheadedness  MSK: no joint or muscle pain  SKIN: no rashes, no ulcers  HEME: no active bleeding, no supraclavicular LAD GENERAL: +weakness. no fever, chills  HEENT: no throat pain, cough, congestion, dysphagia  CARDIAC: no chest pain, palpitations  PULM: no shortness of breath, cough, wheezing   GI: +nausea/vomiting. No abdominal pain, diarrhea, constipation  : no dysuria, frequency  NEURO: +headache, +tremors.  MSK: no joint or muscle pain  SKIN: no rashes, no ulcers  HEME: no active bleeding, no supraclavicular LAD

## 2022-01-01 NOTE — H&P ADULT - NSHPPHYSICALEXAM_GEN_ALL_CORE
Vital Signs Last 24 Hrs  T(C): 37.2 (01 Jan 2022 09:58), Max: 37.2 (01 Jan 2022 09:58)  T(F): 98.9 (01 Jan 2022 09:58), Max: 98.9 (01 Jan 2022 09:58)  HR: 100 (01 Jan 2022 10:30) (100 - 130)  BP: 128/92 (01 Jan 2022 10:30) (128/92 - 169/99)  BP(mean): --  RR: 20 (01 Jan 2022 10:30) (17 - 20)  SpO2: 99% (01 Jan 2022 10:30) (99% - 99%)    CONSTITUTIONAL: alert and active in no apparent distress; appears well-developed and well-nourished;   HEENT: head atraumatic; normal cephalic shape; no conjunctivitis or scleral icterus; EOMI; neck supple w/ FROM  CARDIAC: regular rate & rhythm; normal S1, S2; no murmurs, rubs or gallops.  RESPIRATORY: breath sounds clear to auscultation bilaterally; no distress present, no crackles, wheezes, rales, rhonchi, retractions, or tachypnea; normal rate and effort.  GASTROINTESTINAL: abdomen soft, non-tender, & non-distended; no organomegaly or masses; no HSM appreciated; normoactive bowel sounds.  SKIN: cap refill brisk; skin warm, dry and intact; no evidence of rash.  BACK: no vertebral or paraspinal tenderness along entire spine; no CVAT.  MSK: no joint effusion or tenderness; FROM of all joints; no deformities or erythema noted; 2+ peripheral pulses.  NEURO: alert; interactive; no focal deficits.

## 2022-01-01 NOTE — H&P ADULT - HISTORY OF PRESENT ILLNESS
45 yo M hx of etoh use disorder p/w etoh w/d. Per chart review, pt w/ multiple previous presentations for etoh w/d.  47 yo M hx of etoh use disorder p/w etoh w/d. Per chart review, pt w/ multiple previous presentations for etoh w/d.     In ED vitals; T 98F, , /99, RR 20 on RA.  Received librium 50mg x1, pepcid 20mg x1, 2L NS, zofran 4mg x1, ativan 2mg x2, thiamine 100mg and multivitamin. Started on Ativan taper w/ symptom triggered CIWA. 45 yo M hx of etoh use disorder p/w etoh w/d. Per chart review, pt w/ multiple previous presentations for etoh w/d.     In ED vitals; T 98F, , /99, RR 20 on RA.  Received librium 50mg x1, pepcid 20mg x1, 2L NS, zofran 4mg x1, ativan 2mg x2, thiamine 100mg and multivitamin. Started on CIWA ativan taper. 47 yo M hx of etoh use disorder p/w etoh w/d. Per chart review, pt w/ multiple previous presentations for etoh w/d. Per wife, pt started drinking 8 drinks of tequila 2 weeks ago and last drink was 2 days ago. Never required ICU level care, intubation, no hx of seizures 2/2 etoh w/d. Wife states that pt had severe nausea, vomiting, and weakness prompting family to seek hospitalization for etoh w/d. Clarified with wife regarding ativan allergy; ativan induced more confusion and sluggishness. Denies CP, SOB, abd pain.    In ED vitals; T 98F, , /99, RR 20 on RA.  Received librium 50mg x1, pepcid 20mg x1, 2L NS, zofran 4mg x1, ativan 2mg x2, thiamine 100mg and multivitamin. Started on CIWA ativan taper. 45 yo M hx of etoh use disorder p/w etoh w/d. Per chart review, pt w/ multiple previous presentations for etoh w/d. Per wife, pt started drinking 8 drinks of tequila 2 weeks ago and last drink was 2 days ago. Never required ICU level care, intubation, no hx of seizures 2/2 etoh w/d. Wife states that pt had severe nausea, vomiting, and weakness prompting family to seek hospitalization for etoh w/d. Clarified with wife regarding ativan allergy; ativan induced more confusion and sluggishness. Denies CP, SOB, abd pain.    In ED vitals; T 98F, , /99, RR 20 on RA.  Received librium 50mg x1, pepcid 20mg x1, 2L NS, zofran 4mg x1, ativan 2mg x2, thiamine 100mg and multivitamin. Started on CIWA ativan taper. Highest CIWA in ED 13 for tremors, diaphoresis, headache predominant. 45 yo M hx of etoh use disorder p/w etoh w/d. Per chart review, pt w/ multiple previous presentations for etoh w/d. Per wife, pt started drinking 8 drinks of tequila 2 weeks ago and last drink was 2 days ago. Never required ICU level care, intubation, no hx of seizures 2/2 etoh w/d. Wife states that pt had severe nausea, vomiting, and weakness prompting family to seek hospitalization for etoh w/d. Clarified with wife regarding ativan allergy; ativan induced more confusion and sluggishness. Denies CP, SOB, abd pain, hallucinations.    In ED vitals; T 98F, , /99, RR 20 on RA.  Received librium 50mg x1, pepcid 20mg x1, 2L NS, zofran 4mg x1, ativan 2mg x2, thiamine 100mg and multivitamin. Started on CIWA ativan taper. Highest CIWA in ED 13 for tremors, diaphoresis, headache predominant.

## 2022-01-01 NOTE — H&P ADULT - PROBLEM SELECTOR PLAN 3
dvt ppx: lovenox  diet: reg  dispo: pending dvt ppx: lovenox  diet: reg  dispo: pending    #?DM  - prediabetic on previous hospitalization  - f/u a1c

## 2022-01-01 NOTE — ED ADULT TRIAGE NOTE - CHIEF COMPLAINT QUOTE
presenting for etoh withdrawal, last drink yesterday morning. drinks 8 shots of vodka per day. patient noted to be tachycardic and tremulus. no hx of intubations.  ekg in progress

## 2022-01-01 NOTE — ED PROVIDER NOTE - ATTENDING CONTRIBUTION TO CARE
Afebrile. Awake and Alert. Lungs CTA. Heart Rapid regular. Abdomen soft NTND. CN II-XII grossly intact. Moves all extremities without lateralization.    Nausea and alcohol abuse  Abdominal exam benin  Pt decline rehab, no h/o Sz or DT, monitor for signs of alcohol withdrawal  TWI inferior leads of EKG, seen on EKG from 2019, No CP or SOB

## 2022-01-01 NOTE — H&P ADULT - ATTENDING COMMENTS
46 year old male with a history of EtOH abuse and withdrawal, no ICU admissions or seizures in the past presenting with nausea, vomiting and tremors. Patient given Ativan in ED prior to evaluation and was drowsy, but still able to hold a conversation. Patient states that he has been drinking for many years and recently was drinking 10 shots of tequila daily, last drink was yesterday morning. Patient asking if he can go home today but is amenable to staying until at least tomorrow.     -c/w [facility] protocol  -c/w Ativan taper, does not have true allergy other than drowsiness which is a known side effect, removed from EMR allergy list  -AST/ALT elevated likely in setting of alcohol use/alcohol hepatitis, continue to trend  -c/w MV, folic acid, thiamine, IVF  -monitor electrolytes  -SBIRT and SW eval 46 year old male with a history of EtOH abuse and withdrawal, no ICU admissions or seizures in the past presenting with nausea, vomiting and tremors. Patient given Ativan in ED prior to evaluation and was drowsy, but still able to hold a conversation. Patient states that he has been drinking for many years and recently was drinking 10 shots of tequila daily, last drink was yesterday morning. Patient asking if he can go home today but is amenable to staying until at least tomorrow.     -c/w [facility] protocol  -c/w Ativan taper, does not have true allergy other than drowsiness which is a known side effect, removed from EMR allergy list  -AST/ALT elevated likely in setting of alcohol use/alcohol hepatitis, continue to trend  -c/w MV, folic acid, thiamine, IVF  -monitor electrolytes  -hyperglycemia noted on BMP with glucose of 217, check A1c in AM; previously was 5.6% in January 2020  -SBIRT and SW ashley    D/w Dr. De Leon 46 year old male with a history of EtOH abuse and withdrawal, no ICU admissions or seizures in the past presenting with nausea, vomiting and tremors. Patient given Ativan in ED prior to evaluation and was drowsy, but still able to hold a conversation. Patient states that he has been drinking for many years and recently was drinking 10 shots of tequila daily, last drink was yesterday morning. Patient asking if he can go home today but is amenable to staying until at least tomorrow.     -c/w [facility] protocol  -c/w Ativan taper, does not have true allergy other than drowsiness which is a known side effect, removed from EMR allergy list  -AST/ALT elevated likely in setting of alcohol use/alcohol hepatitis, continue to trend  -c/w MV, folic acid, thiamine, IVF  -monitor electrolytes  -hyperglycemia noted on BMP with glucose of 217, check A1c in AM; previously was 5.6% in January 2020  -HTN likely in setting of acute alcohol withdrawal, continue to monitor  -SBIRT and ROYCE ramirez    D/w Dr. De Leon

## 2022-01-02 VITALS
TEMPERATURE: 98 F | HEART RATE: 104 BPM | OXYGEN SATURATION: 99 % | SYSTOLIC BLOOD PRESSURE: 134 MMHG | RESPIRATION RATE: 18 BRPM | DIASTOLIC BLOOD PRESSURE: 92 MMHG

## 2022-01-02 DIAGNOSIS — F10.10 ALCOHOL ABUSE, UNCOMPLICATED: ICD-10-CM

## 2022-01-02 LAB
A1C WITH ESTIMATED AVERAGE GLUCOSE RESULT: 6 % — HIGH (ref 4–5.6)
ALBUMIN SERPL ELPH-MCNC: 3.9 G/DL — SIGNIFICANT CHANGE UP (ref 3.3–5)
ALP SERPL-CCNC: 85 U/L — SIGNIFICANT CHANGE UP (ref 40–120)
ALT FLD-CCNC: 87 U/L — HIGH (ref 4–41)
ANION GAP SERPL CALC-SCNC: 12 MMOL/L — SIGNIFICANT CHANGE UP (ref 7–14)
ANION GAP SERPL CALC-SCNC: 13 MMOL/L — SIGNIFICANT CHANGE UP (ref 7–14)
APTT BLD: 29.9 SEC — SIGNIFICANT CHANGE UP (ref 27–36.3)
AST SERPL-CCNC: 154 U/L — HIGH (ref 4–40)
BILIRUB SERPL-MCNC: 0.8 MG/DL — SIGNIFICANT CHANGE UP (ref 0.2–1.2)
BUN SERPL-MCNC: 12 MG/DL — SIGNIFICANT CHANGE UP (ref 7–23)
BUN SERPL-MCNC: 12 MG/DL — SIGNIFICANT CHANGE UP (ref 7–23)
CALCIUM SERPL-MCNC: 9.1 MG/DL — SIGNIFICANT CHANGE UP (ref 8.4–10.5)
CALCIUM SERPL-MCNC: 9.1 MG/DL — SIGNIFICANT CHANGE UP (ref 8.4–10.5)
CHLORIDE SERPL-SCNC: 95 MMOL/L — LOW (ref 98–107)
CHLORIDE SERPL-SCNC: 95 MMOL/L — LOW (ref 98–107)
CO2 SERPL-SCNC: 25 MMOL/L — SIGNIFICANT CHANGE UP (ref 22–31)
CO2 SERPL-SCNC: 25 MMOL/L — SIGNIFICANT CHANGE UP (ref 22–31)
CREAT SERPL-MCNC: 0.73 MG/DL — SIGNIFICANT CHANGE UP (ref 0.5–1.3)
CREAT SERPL-MCNC: 0.75 MG/DL — SIGNIFICANT CHANGE UP (ref 0.5–1.3)
ESTIMATED AVERAGE GLUCOSE: 126 — SIGNIFICANT CHANGE UP
GLUCOSE SERPL-MCNC: 113 MG/DL — HIGH (ref 70–99)
GLUCOSE SERPL-MCNC: 113 MG/DL — HIGH (ref 70–99)
HCT VFR BLD CALC: 37.3 % — LOW (ref 39–50)
HGB BLD-MCNC: 12.6 G/DL — LOW (ref 13–17)
INR BLD: 1.01 RATIO — SIGNIFICANT CHANGE UP (ref 0.88–1.16)
MAGNESIUM SERPL-MCNC: 1.9 MG/DL — SIGNIFICANT CHANGE UP (ref 1.6–2.6)
MCHC RBC-ENTMCNC: 27.3 PG — SIGNIFICANT CHANGE UP (ref 27–34)
MCHC RBC-ENTMCNC: 33.8 GM/DL — SIGNIFICANT CHANGE UP (ref 32–36)
MCV RBC AUTO: 80.9 FL — SIGNIFICANT CHANGE UP (ref 80–100)
NRBC # BLD: 0 /100 WBCS — SIGNIFICANT CHANGE UP
NRBC # FLD: 0 K/UL — SIGNIFICANT CHANGE UP
PHOSPHATE SERPL-MCNC: 2.1 MG/DL — LOW (ref 2.5–4.5)
PLATELET # BLD AUTO: 98 K/UL — LOW (ref 150–400)
POTASSIUM SERPL-MCNC: 3.5 MMOL/L — SIGNIFICANT CHANGE UP (ref 3.5–5.3)
POTASSIUM SERPL-MCNC: 3.7 MMOL/L — SIGNIFICANT CHANGE UP (ref 3.5–5.3)
POTASSIUM SERPL-SCNC: 3.5 MMOL/L — SIGNIFICANT CHANGE UP (ref 3.5–5.3)
POTASSIUM SERPL-SCNC: 3.7 MMOL/L — SIGNIFICANT CHANGE UP (ref 3.5–5.3)
PROT SERPL-MCNC: 7.1 G/DL — SIGNIFICANT CHANGE UP (ref 6–8.3)
PROTHROM AB SERPL-ACNC: 11.6 SEC — SIGNIFICANT CHANGE UP (ref 10.6–13.6)
RBC # BLD: 4.61 M/UL — SIGNIFICANT CHANGE UP (ref 4.2–5.8)
RBC # FLD: 14.8 % — HIGH (ref 10.3–14.5)
SODIUM SERPL-SCNC: 132 MMOL/L — LOW (ref 135–145)
SODIUM SERPL-SCNC: 133 MMOL/L — LOW (ref 135–145)
WBC # BLD: 7.22 K/UL — SIGNIFICANT CHANGE UP (ref 3.8–10.5)
WBC # FLD AUTO: 7.22 K/UL — SIGNIFICANT CHANGE UP (ref 3.8–10.5)

## 2022-01-02 PROCEDURE — 99239 HOSP IP/OBS DSCHRG MGMT >30: CPT | Mod: GC

## 2022-01-02 RX ADMIN — Medication 1 MILLIGRAM(S): at 09:44

## 2022-01-02 RX ADMIN — Medication 2 MILLIGRAM(S): at 06:04

## 2022-01-02 RX ADMIN — Medication 1 MILLIGRAM(S): at 12:11

## 2022-01-02 RX ADMIN — Medication 2 MILLIGRAM(S): at 02:11

## 2022-01-02 RX ADMIN — Medication 1 TABLET(S): at 12:11

## 2022-01-02 RX ADMIN — Medication 100 MILLIGRAM(S): at 12:11

## 2022-01-02 NOTE — PROGRESS NOTE ADULT - SUBJECTIVE AND OBJECTIVE BOX
Fabricio Haley, PGY1  [phone]/[id number]/On Teams     #[id number] ([facility])    OVERNIGHT EVENTS/SUBJECTIVE:  Patient seen and examined at bedside. CIWA 1-2 ovn, no additional prn ativan required. Pt stated he just needs to get home in AM.      ROS: Denies headache, hallucinations, CP, SOB, n/v.      OBJECTIVE:    VITAL SIGNS:  ICU Vital Signs Last 24 Hrs  T(C): 36.6 (02 Jan 2022 06:00), Max: 37.4 (01 Jan 2022 12:03)  T(F): 97.8 (02 Jan 2022 06:00), Max: 99.3 (01 Jan 2022 12:03)  HR: 98 (02 Jan 2022 06:00) (94 - 105)  BP: 131/93 (02 Jan 2022 06:00) (116/95 - 147/92)  BP(mean): --  ABP: --  ABP(mean): --  RR: 18 (02 Jan 2022 06:00) (17 - 20)  SpO2: 98% (02 Jan 2022 06:00) (97% - 100%)        01-01 @ 07:01  -  01-02 @ 07:00  --------------------------------------------------------  IN: 600 mL / OUT: 0 mL / NET: 600 mL      CAPILLARY BLOOD GLUCOSE      POCT Blood Glucose.: 244 mg/dL (01 Jan 2022 05:29)      PHYSICAL EXAM:    General: NAD; awake and alert   HEENT: PERRL grossly, clear conjunctiva  Neck: No jvd, no lymphadenopathy  Respiratory: CTA b/l, no rales, no wheezes; equal respiratory excursion   Cardiovascular: +S1/S2; RRR  Abdomen: soft, NT/ND; +BS x4  Extremities: WWP, 2+ peripheral pulses b/l; no LE edema  Skin: normal color and turgor; no rash  Neurological: No gross motor or sensory deficits; coordination intact; minimally tremulous    MEDICATIONS:  MEDICATIONS  (STANDING):  enoxaparin Injectable 40 milliGRAM(s) SubCutaneous every 24 hours  folic acid 1 milliGRAM(s) Oral daily  lactated ringers. 1000 milliLiter(s) (50 mL/Hr) IV Continuous <Continuous>  LORazepam   Injectable 2 milliGRAM(s) IV Push every 4 hours  LORazepam   Injectable 1.5 milliGRAM(s) IV Push every 4 hours  LORazepam   Injectable 4  IV Push   multivitamin 1 Tablet(s) Oral daily  thiamine 100 milliGRAM(s) Oral daily    MEDICATIONS  (PRN):  LORazepam     Tablet 1 milliGRAM(s) Oral every 2 hours PRN CIWA-Ar score increase by 2 points and a total score of 7 or less  LORazepam     Tablet 1 milliGRAM(s) Oral every 1 hour PRN CIWA-Ar score 8 or greater  LORazepam     Tablet 1 milliGRAM(s) Oral every 2 hours PRN Symptom-triggered 2 point increase in CIWA-Ar  LORazepam     Tablet 1 milliGRAM(s) Oral every 1 hour PRN Symptom-triggered: each CIWA -Ar score 8 or GREATER      ALLERGIES:  Allergies    No Known Allergies    Intolerances        LABS:                        12.6   7.22  )-----------( 98       ( 02 Jan 2022 07:00 )             37.3     01-02    133<L>  |  95<L>  |  12  ----------------------------<  113<H>  3.7   |  25  |  0.75    Ca    9.1      02 Jan 2022 07:00  Phos  2.1     01-02  Mg     1.90     01-02    TPro  7.1  /  Alb  3.9  /  TBili  0.8  /  DBili  x   /  AST  154<H>  /  ALT  87<H>  /  AlkPhos  85  01-02    PT/INR - ( 02 Jan 2022 07:00 )   PT: 11.6 sec;   INR: 1.01 ratio         PTT - ( 02 Jan 2022 07:00 )  PTT:29.9 sec      RADIOLOGY & ADDITIONAL TESTS: Reviewed.

## 2022-01-02 NOTE — PROVIDER CONTACT NOTE (OTHER) - SITUATION
Pts wife wanting to sign AMA papers for  to leave hospital. Pts wife also does not want patient to be given ativan. Wife states "she will take care of him from here"
Pts wife wants  to be discharged, threatening to call the  on us.

## 2022-01-02 NOTE — PROVIDER CONTACT NOTE (OTHER) - BACKGROUND
Pt admitted with alcohol dependence with withdrawal
Pt admitted with alcohol withdrawal with dependence

## 2022-01-02 NOTE — PROGRESS NOTE ADULT - PROBLEM SELECTOR PLAN 3
DVT ppx: Improve score 0, SCDs  Diet: increase as tolerated, dvt ppx: lovenox  diet: reg  dispo: pending    #?DM  - prediabetic on previous hospitalization  - a1c 6.0

## 2022-01-02 NOTE — PROGRESS NOTE ADULT - ASSESSMENT
43 yo M with PMH of EtOH abuse/withdrawal in the past with previous admission in September 2019 with subsequent AMA p/w nausea/vomiting after binge drinking the past 2 weeks with last drink yesterday afternoon 1/10/20. 45 yo M hx of etoh use disorder (multiple presentations in the past) p/w etoh w/d. Started on ativan taper.

## 2022-01-02 NOTE — CHART NOTE - NSCHARTNOTEFT_GEN_A_CORE
Pt without capacity to make medical decisions for self. Wife upset regarding ativan - stating it makes him more confused and tired. Offered transition to alternative therapy and continued hospitalization, but pt's wife wishes to continue with taking patient home AMA. Potential life threatening sequelae of etoh withdrawal explained to wife again. She verbalized understanding, but stated that she does not want patient hospitalized any further. Verbal consent from wife obtained for AMA.    Fabricio De Leon, PGY1

## 2022-01-02 NOTE — PROGRESS NOTE ADULT - PROBLEM SELECTOR PLAN 1
Pt with hx of alcohol abuse and withdrawals in the past with multiple documented presentations to the ED including an AMA in september 2019 p/w N/V in the setting of binge drinking the past two weeks with last drink yesterday afternoon.   -No hx of ICU admission, DT, or seizures in the past  -CIWA monitoring  -Ativan taper  -Thiamine, folic acid  -cont to monitor Last drink reported two days prior to admission. Never admitted to ICU, no hx of intubation, no hx seizures.  - Per wife, ativan produced more confusion/sedative effect.  - c/w ativan taper for now  - c/w thiamine, vitamin, folate

## 2022-01-02 NOTE — PROGRESS NOTE ADULT - ATTENDING COMMENTS
46M with etoh withdrawal. patient seen today. no overt signs of withdrawal. states he wants to go home. patient is intermittently confused on exam, attempting to leave the room. on 1:1 for safety. patient's wife wants to take patient home. understand the risk of worsening withdrawal. will take responsibility for patient.   left AMA.

## 2022-01-02 NOTE — CHART NOTE - NSCHARTNOTEFT_GEN_A_CORE
Spoke to wife regarding patient care, wife does not agree with treatment plan and requesting to take patient home AMA. Wife stating ativan makes pt sleepier and confused. Medication and goal of treatment for etoh withdrawal explained to wife with minimal impact. Explained to wife regarding potential life threatening consequences of etoh withdrawal. Wife informed of primary team's plan to prepare safer plan prior to leaving hospital AMA. Still not in agreement with plan and reiterated that she would take all liability and wants to sign pt out of hospital.    Fabricio De Leon, PGY1

## 2022-01-02 NOTE — PROGRESS NOTE ADULT - PROBLEM SELECTOR PLAN 2
Pt with hx of transaminitis on presentations in the past now with elevation from known baseline  -Likely related to underlying alcoholic hepatitis  -Has had RUQ US and CT A/P in 2019 demonstrating hepatic steatosis  -cont to trend CMP AST>ALT on presentation (114 and 81) characteristic of etoh pattern.  - trend on CMP  - f/u INR

## 2022-05-17 NOTE — PROVIDER CONTACT NOTE (OTHER) - ACTION/TREATMENT ORDERED:
New onset 5/6  on amiodarone and Heparin infusions  Cont Cardiac monitoring  In NSR this AM  
MD notified. MD will call wife.
MD notified, will hold ativan for now. presenting case to attending

## 2022-07-25 NOTE — ED PROVIDER NOTE - INTERPRETATION
7/25/2022   CARE MANAGEMENT NOTE:  CM reviewed EMR and handoff received from previous  Dominikeden Madrigal). READMISSION:  Pt was admitted to [facility] from 7/16 - 7/19 with abd pain and SOB. He returned home with his wife. Pt was readmitted to [facility] on 7/21/22 with open wound of right heel. Reportedly, pt resides with his wife Roma Gracia (c [phone]) and three adult sons. RUR 23%    Transition Plan of Care:  ID, Podiatry following for medical management  PT/OT evals pending; await recs  Plan is for pt to return home  ESRD - HD on M,W,F at [address] follow up  Family will transport pt home    CM will continue to follow pt until discharged.   Nestor abnormal

## 2022-08-08 NOTE — PROGRESS NOTE ADULT - PROBLEM SELECTOR PLAN 6
Secondary to intractable vomiting  - continue with IVF, antiemetics  - monitor BMP gen: well appearing  Mentation: AAO x 1  psych: mood appropriate  HEENT: airway patent, conjunctivae clear bilaterally  Cardio: RRR, no m/r/g  Resp: normal BS b/l  GI: soft/nondistended/nontender  Neuro: sensation and motor function grossly intact  Skin: No evidence of rash or laceration. 2x2cm soft tissue swelling on the left posterior scalp that is nontender  MSK: normal movement of all extremities

## 2022-12-14 NOTE — ED ADULT TRIAGE NOTE - LOCATION:
Right arm; Detail Level: Simple Price (Do Not Change): 0.00 Instructions: This plan will send the code FBSD to the PM system.  DO NOT or CHANGE the price.

## 2023-08-24 NOTE — ED ADULT NURSE NOTE - SCORE
-- DO NOT REPLY / DO NOT REPLY ALL --  -- Message is from [facility] ([facility]) --    General Patient Message: Patient is down about 25 pounds and doesn't want to increase her current dose     Caller Information       Type Contact Phone/Fax    08/22/2023 06:26 PM CDT Web (Incoming) Shara Lockhart (Self)     08/23/2023 10:02 AM CDT Phone (Outgoing) Shara Lockhart (Self) [phone] (M)        Alternative phone number: n/a     Can a detailed message be left? Yes    Message Turnaround: [place]:    Refer to site's KB page for routing instructions    Please give this turnaround time to the caller:   \"You can expect to receive a response 2-3 business days after your provider's clinical team reviews the message\"              
Left message for patient to give a call back at earliest convenience.   
Patients ozempic is not covered under patients plan. Patient needs to be type 2 diabetic.  
Please call pt and see how she is doing with medication, please check a weight and see if she is losing on this dose if she would like to increase    
Please see below and advise.  
11

## 2023-10-23 NOTE — ED PROVIDER NOTE - PHYSICAL EXAMINATION
She is at risk for falling and has been provided with information to reduce the risk of falling at home.   GEN: appears tremulous  HEENT: NCAT, MMM, normal conjunctiva, perrl  CHEST/LUNGS: Non-tachypneic, CTAB, bilateral breath sounds  CARDIAC: tachycardic, s1s2, normal perfusion, no peripheral edema  ABDOMEN: Soft, NTND, No rebound/guarding  MSK: No joint tenderness, no gross deformity of extremities  SKIN: No rashes, no petechiae, no vesicles  NEURO: CN grossly intact, normal coordination, no focal motor or sensory deficits  PSYCH: Alert, appropriate, cooperative

## 2024-10-04 NOTE — ED ADULT TRIAGE NOTE - HISTORY OF COVID-19 VACCINATION
Vaccine status unknown
No. ZEKE screening performed.  STOP BANG Legend: 0-2 = LOW Risk; 3-4 = INTERMEDIATE Risk; 5-8 = HIGH Risk

## 2024-11-29 NOTE — ED ADULT TRIAGE NOTE - NS ED TRIAGE AVPU SCALE
Copied from CRM #[id number]. Topic: MW Medication/Rx - MW Rx Refill  >> Nov 29, 2024  3:17 PM Katelyn FIELD wrote:  Arturo,  [facility] called to request a medication refill and is out of medications or critically low during working hours. Medication is:  Listed on Med Management list. [facility] Clinical to process refill: Selected 'Wrap Up CRM' and created new Refill Med Encounter after clicking 'Convert to Clinical Call'. Selected reason for call 'Refill Request'. Pended medication. Sent Med template and routed as high priority to [facility] CLINICAL MSG POOL. Readback full message.    -- DO NOT REPLY / DO NOT REPLY ALL --  -- This inbox is not monitored. If this was sent to the wrong provider or department, reroute message to P [facility] Reroute pool. --  -- Message is from [facility] ([facility]) --      Message Type:  Refill Medication   Refill request for Pended medication named: Descovy 200-25 MG per tablet     Preferred pharmacy verified, and selected.   [facility] [address]    Is the patient OUT of Medication?  Yes and During Work Hours Medication Refills handled by [facility] Clinical - route as high priority to [facility] CLINICAL MSG pool. Patient has been advised it will be addressed within 1 business day.     Message: n/a                   Alert-The patient is alert, awake and responds to voice. The patient is oriented to time, place, and person. The triage nurse is able to obtain subjective information.

## 2025-01-23 NOTE — PATIENT PROFILE ADULT - NSPROMEDSADMININFO_GEN_A_NUR
Physical Therapy    Patient not seen in therapy.     Patient not available: Pt. with lactation.      Re-attempt plan: per established plan of care    Patient was not available for their therapy session at this time due to: pt. With lactation.  Will re-attempt per established treatment plan        OBJECTIVE                        Therapy procedure time and total treatment time can be found documented on the Time Entry flowsheet   no concerns

## 2025-06-30 NOTE — ED ADULT NURSE NOTE - IS THE PATIENT ABLE TO BE SCREENED?
Medication failed protocol.    Medication:     clopidogrel (PLAVIX) 75 MG tablet     Medication Refill Protocol Failed.  Protocol approved due to: data identified in chart review.   Last office visit date: 01/02/2025  Next appointment scheduled?: Yes 07/03/2025        Plavix Refill Protocol - 12 Month Protocol [id number]/2025 05:58 AM   Protocol Details Normal HGB in past 12 months looking at last value -- IF CRITERIA FAILED REFER TO PROTOCOL DETAILS        
Yes